# Patient Record
Sex: FEMALE | Race: WHITE | NOT HISPANIC OR LATINO
[De-identification: names, ages, dates, MRNs, and addresses within clinical notes are randomized per-mention and may not be internally consistent; named-entity substitution may affect disease eponyms.]

---

## 2017-09-01 ENCOUNTER — APPOINTMENT (OUTPATIENT)
Dept: MRI IMAGING | Facility: CLINIC | Age: 62
End: 2017-09-01
Payer: COMMERCIAL

## 2017-09-01 ENCOUNTER — OUTPATIENT (OUTPATIENT)
Dept: OUTPATIENT SERVICES | Facility: HOSPITAL | Age: 62
LOS: 1 days | End: 2017-09-01
Payer: COMMERCIAL

## 2017-09-01 DIAGNOSIS — Z00.8 ENCOUNTER FOR OTHER GENERAL EXAMINATION: ICD-10-CM

## 2017-09-01 PROCEDURE — 73721 MRI JNT OF LWR EXTRE W/O DYE: CPT | Mod: 26,RT

## 2017-09-01 PROCEDURE — 73721 MRI JNT OF LWR EXTRE W/O DYE: CPT

## 2018-05-23 ENCOUNTER — APPOINTMENT (OUTPATIENT)
Dept: ORTHOPEDIC SURGERY | Facility: CLINIC | Age: 63
End: 2018-05-23
Payer: COMMERCIAL

## 2018-05-23 VITALS — WEIGHT: 180 LBS | BODY MASS INDEX: 28.25 KG/M2 | HEIGHT: 67 IN

## 2018-05-23 DIAGNOSIS — Z78.9 OTHER SPECIFIED HEALTH STATUS: ICD-10-CM

## 2018-05-23 DIAGNOSIS — Z86.79 PERSONAL HISTORY OF OTHER DISEASES OF THE CIRCULATORY SYSTEM: ICD-10-CM

## 2018-05-23 DIAGNOSIS — Z82.61 FAMILY HISTORY OF ARTHRITIS: ICD-10-CM

## 2018-05-23 DIAGNOSIS — Z80.9 FAMILY HISTORY OF MALIGNANT NEOPLASM, UNSPECIFIED: ICD-10-CM

## 2018-05-23 PROCEDURE — 20610 DRAIN/INJ JOINT/BURSA W/O US: CPT | Mod: RT

## 2018-05-23 PROCEDURE — 73564 X-RAY EXAM KNEE 4 OR MORE: CPT | Mod: RT

## 2018-05-23 PROCEDURE — 99204 OFFICE O/P NEW MOD 45 MIN: CPT | Mod: 25

## 2018-05-23 PROCEDURE — 73560 X-RAY EXAM OF KNEE 1 OR 2: CPT | Mod: LT

## 2018-06-22 ENCOUNTER — APPOINTMENT (OUTPATIENT)
Dept: ORTHOPEDIC SURGERY | Facility: CLINIC | Age: 63
End: 2018-06-22
Payer: COMMERCIAL

## 2018-06-22 PROCEDURE — 20610 DRAIN/INJ JOINT/BURSA W/O US: CPT | Mod: RT

## 2018-08-23 ENCOUNTER — EMERGENCY (EMERGENCY)
Facility: HOSPITAL | Age: 63
LOS: 1 days | Discharge: HOME | End: 2018-08-23
Attending: EMERGENCY MEDICINE
Payer: COMMERCIAL

## 2018-08-23 VITALS
HEART RATE: 72 BPM | SYSTOLIC BLOOD PRESSURE: 177 MMHG | WEIGHT: 179.9 LBS | OXYGEN SATURATION: 100 % | HEIGHT: 68 IN | TEMPERATURE: 98 F | DIASTOLIC BLOOD PRESSURE: 102 MMHG | RESPIRATION RATE: 18 BRPM

## 2018-08-23 VITALS
OXYGEN SATURATION: 99 % | TEMPERATURE: 98 F | DIASTOLIC BLOOD PRESSURE: 89 MMHG | RESPIRATION RATE: 18 BRPM | SYSTOLIC BLOOD PRESSURE: 170 MMHG | HEART RATE: 72 BPM

## 2018-08-23 LAB
ALBUMIN SERPL ELPH-MCNC: 4.9 G/DL — SIGNIFICANT CHANGE UP (ref 3.3–5)
ALP SERPL-CCNC: 86 U/L — SIGNIFICANT CHANGE UP (ref 40–120)
ALT FLD-CCNC: 19 U/L — SIGNIFICANT CHANGE UP (ref 10–45)
ANION GAP SERPL CALC-SCNC: 15 MMOL/L — SIGNIFICANT CHANGE UP (ref 5–17)
AST SERPL-CCNC: 17 U/L — SIGNIFICANT CHANGE UP (ref 10–40)
BASOPHILS # BLD AUTO: 0.1 K/UL — SIGNIFICANT CHANGE UP (ref 0–0.2)
BASOPHILS NFR BLD AUTO: 1 % — SIGNIFICANT CHANGE UP (ref 0–2)
BILIRUB SERPL-MCNC: 0.5 MG/DL — SIGNIFICANT CHANGE UP (ref 0.2–1.2)
BUN SERPL-MCNC: 19 MG/DL — SIGNIFICANT CHANGE UP (ref 7–23)
CALCIUM SERPL-MCNC: 10.2 MG/DL — SIGNIFICANT CHANGE UP (ref 8.4–10.5)
CHLORIDE SERPL-SCNC: 95 MMOL/L — LOW (ref 96–108)
CO2 SERPL-SCNC: 25 MMOL/L — SIGNIFICANT CHANGE UP (ref 22–31)
CREAT SERPL-MCNC: 0.74 MG/DL — SIGNIFICANT CHANGE UP (ref 0.5–1.3)
EOSINOPHIL # BLD AUTO: 0.1 K/UL — SIGNIFICANT CHANGE UP (ref 0–0.5)
EOSINOPHIL NFR BLD AUTO: 2 % — SIGNIFICANT CHANGE UP (ref 0–6)
GLUCOSE SERPL-MCNC: 133 MG/DL — HIGH (ref 70–99)
HCT VFR BLD CALC: 45.1 % — HIGH (ref 34.5–45)
HGB BLD-MCNC: 15.4 G/DL — SIGNIFICANT CHANGE UP (ref 11.5–15.5)
LYMPHOCYTES # BLD AUTO: 1.8 K/UL — SIGNIFICANT CHANGE UP (ref 1–3.3)
LYMPHOCYTES # BLD AUTO: 26.9 % — SIGNIFICANT CHANGE UP (ref 13–44)
MCHC RBC-ENTMCNC: 29 PG — SIGNIFICANT CHANGE UP (ref 27–34)
MCHC RBC-ENTMCNC: 34 GM/DL — SIGNIFICANT CHANGE UP (ref 32–36)
MCV RBC AUTO: 85.3 FL — SIGNIFICANT CHANGE UP (ref 80–100)
MONOCYTES # BLD AUTO: 0.4 K/UL — SIGNIFICANT CHANGE UP (ref 0–0.9)
MONOCYTES NFR BLD AUTO: 5.7 % — SIGNIFICANT CHANGE UP (ref 2–14)
NEUTROPHILS # BLD AUTO: 4.4 K/UL — SIGNIFICANT CHANGE UP (ref 1.8–7.4)
NEUTROPHILS NFR BLD AUTO: 64.4 % — SIGNIFICANT CHANGE UP (ref 43–77)
PLATELET # BLD AUTO: 329 K/UL — SIGNIFICANT CHANGE UP (ref 150–400)
POTASSIUM SERPL-MCNC: 3.3 MMOL/L — LOW (ref 3.5–5.3)
POTASSIUM SERPL-SCNC: 3.3 MMOL/L — LOW (ref 3.5–5.3)
PROT SERPL-MCNC: 8.7 G/DL — HIGH (ref 6–8.3)
RBC # BLD: 5.29 M/UL — HIGH (ref 3.8–5.2)
RBC # FLD: 11.8 % — SIGNIFICANT CHANGE UP (ref 10.3–14.5)
SODIUM SERPL-SCNC: 135 MMOL/L — SIGNIFICANT CHANGE UP (ref 135–145)
TROPONIN T, HIGH SENSITIVITY RESULT: <6 NG/L — SIGNIFICANT CHANGE UP (ref 0–51)
WBC # BLD: 6.9 K/UL — SIGNIFICANT CHANGE UP (ref 3.8–10.5)
WBC # FLD AUTO: 6.9 K/UL — SIGNIFICANT CHANGE UP (ref 3.8–10.5)

## 2018-08-23 PROCEDURE — 70450 CT HEAD/BRAIN W/O DYE: CPT

## 2018-08-23 PROCEDURE — 96360 HYDRATION IV INFUSION INIT: CPT

## 2018-08-23 PROCEDURE — 70450 CT HEAD/BRAIN W/O DYE: CPT | Mod: 26

## 2018-08-23 PROCEDURE — 85027 COMPLETE CBC AUTOMATED: CPT

## 2018-08-23 PROCEDURE — 71045 X-RAY EXAM CHEST 1 VIEW: CPT | Mod: 26

## 2018-08-23 PROCEDURE — 80053 COMPREHEN METABOLIC PANEL: CPT

## 2018-08-23 PROCEDURE — 99284 EMERGENCY DEPT VISIT MOD MDM: CPT | Mod: 25

## 2018-08-23 PROCEDURE — 93005 ELECTROCARDIOGRAM TRACING: CPT | Mod: 76

## 2018-08-23 PROCEDURE — 84484 ASSAY OF TROPONIN QUANT: CPT

## 2018-08-23 PROCEDURE — 96361 HYDRATE IV INFUSION ADD-ON: CPT

## 2018-08-23 PROCEDURE — 99284 EMERGENCY DEPT VISIT MOD MDM: CPT

## 2018-08-23 PROCEDURE — 71045 X-RAY EXAM CHEST 1 VIEW: CPT

## 2018-08-23 RX ORDER — MECLIZINE HCL 12.5 MG
1 TABLET ORAL
Qty: 20 | Refills: 0
Start: 2018-08-23 | End: 2018-08-27

## 2018-08-23 RX ORDER — MECLIZINE HCL 12.5 MG
50 TABLET ORAL ONCE
Qty: 0 | Refills: 0 | Status: COMPLETED | OUTPATIENT
Start: 2018-08-23 | End: 2018-08-23

## 2018-08-23 RX ORDER — POTASSIUM CHLORIDE 20 MEQ
40 PACKET (EA) ORAL ONCE
Qty: 0 | Refills: 0 | Status: COMPLETED | OUTPATIENT
Start: 2018-08-23 | End: 2018-08-23

## 2018-08-23 RX ORDER — SODIUM CHLORIDE 9 MG/ML
1000 INJECTION INTRAMUSCULAR; INTRAVENOUS; SUBCUTANEOUS ONCE
Qty: 0 | Refills: 0 | Status: COMPLETED | OUTPATIENT
Start: 2018-08-23 | End: 2018-08-23

## 2018-08-23 RX ADMIN — SODIUM CHLORIDE 1000 MILLILITER(S): 9 INJECTION INTRAMUSCULAR; INTRAVENOUS; SUBCUTANEOUS at 11:40

## 2018-08-23 RX ADMIN — SODIUM CHLORIDE 1000 MILLILITER(S): 9 INJECTION INTRAMUSCULAR; INTRAVENOUS; SUBCUTANEOUS at 10:07

## 2018-08-23 RX ADMIN — Medication 40 MILLIEQUIVALENT(S): at 12:03

## 2018-08-23 RX ADMIN — Medication 0.5 MILLIGRAM(S): at 12:26

## 2018-08-23 RX ADMIN — Medication 50 MILLIGRAM(S): at 10:07

## 2018-08-23 NOTE — ED PROVIDER NOTE - PROGRESS NOTE DETAILS
CT negative. Pt felt better after 50 of meclozine, but had another episode of dizziness while going to the bathroom. Will give .5 ativan and reassess. Pt feeling much better after ativan. Able to walk without symptoms. Pt feeling much better after ativan. Able to walk without symptoms. Likely peripheral vertigo. Will DC with Rx for ativan and meclizine with f/u to cardiology and ENT.

## 2018-08-23 NOTE — ED PROVIDER NOTE - OBJECTIVE STATEMENT
62F hx of HTN, HLD, and CAD presenting to the ED with dizziness. She reports waking up at 1 AM to urinate and feeling sudden onset dizziness as soon as she stood up. Associated symptoms include nausea and SOB. She went to back to bed without issue, but decided to come to the ED when the same symptoms persisted this mornind. Denies any chest pain, numb 62F hx of HTN, HLD, and CAD presenting to the ED with dizziness. She reports waking up at 1 AM to urinate and feeling sudden onset dizziness as soon as she stood up. Associated symptoms include nausea and SOB. She went to back to bed without issue, but decided to come to the ED when the same symptoms persisted this morning. States "I don't feel like the room is spinning but I feel unsteady and lightheaded." Denies any prior occurrence of these symptoms. Denies any chest pain, numbness, tingling, weakness, headache, changes in vision, abdominal pain, vomiting, diarrhea, or recent illness. Normal neuro exam without dysmetria or ataxia on presentation.

## 2018-08-23 NOTE — ED ADULT NURSE NOTE - OBJECTIVE STATEMENT
62y f pt arrived in ED with son from home; pt c/o waking at around 2am feeling dizzy and nauseous; pt states was unsteady on feet went back to bed and slept; when woke again this am still feeling dizzy, weak, nauseous, unable to ambulate without assist; aox3; no resp distress; no chest pain; + sob; + diaphoresis; no vision changes; + perrl; abd soft nontender nondistended; no le edema; pt c/o leg cramping for the last month; pt also c/o headache; pt states has hx of headaches; pt denies fever/chills; no cough/congestion; no numbness; + areas of tingling; pt also c/o "belching"; iv placed; labs drawn; pt medicated per md order; safety/comfort maintained; son at bedside; ekg done; pt placed on cardiac monitor; nsr on monitor

## 2018-08-23 NOTE — ED PROVIDER NOTE - NS ED ROS FT
GENERAL: +dizziness. No fever or chills  EYES: no change in vision  HEENT: no trouble swallowing or speaking  CARDIAC: no chest pain  PULMONARY: +SOB. no cough   GI: +nausea. no abdominal pain, no vomiting, no diarrhea or constipation  : No changes in urination  SKIN: no rashes  NEURO: no headache, numbness, tingling, weakness  MSK: No joint pain     ~Chris Elmore PGY1

## 2018-08-23 NOTE — ED ADULT NURSE NOTE - NSIMPLEMENTINTERV_GEN_ALL_ED
Implemented All Universal Safety Interventions:  [place] to call system. Call bell, personal items and telephone within reach. Instruct patient to call for assistance. Room bathroom lighting operational. Non-slip footwear when patient is off stretcher. Physically safe environment: no spills, clutter or unnecessary equipment. Stretcher in lowest position, wheels locked, appropriate side rails in place.

## 2018-08-23 NOTE — ED PROVIDER NOTE - MEDICAL DECISION MAKING DETAILS
62F hx of HTN, HLD, and CAD presenting to the ED with dizziness. Does not say room is spinning but sounds more vertiginous than lightheadedness. No chest pain or neuro symptoms. No nystagmus and normal test of skew on exam. Most likely vertigo but due to age, RFs (HTN/HLD/CAD), and lack of prior occurrence will plan to obtain CT/CTA of head/neck. Will also obtain basic labs, trops (no chest pain but abnormal ekg without prior). Will administer meclozine and reassess. 62F hx of HTN, HLD, and CAD presenting to the ED with dizziness. Does not say room is spinning but sounds more vertiginous than lightheadedness. No chest pain or neuro symptoms. No nystagmus and normal test of skew on exam. Most likely vertigo but due to age, RFs (HTN/HLD/CAD), and lack of prior occurrence will plan to obtain CT/CTA of head/neck. Will also obtain basic labs, trops (no chest pain but abnormal ekg without prior). Will administer meclozine and reassess.    ADA Vaz MD: Pt is a 61 y/o female with PMH HTN, HLD, and CAD who p/w c/o dizziness. She reports waking up at 1 AM to urinate and feeling sudden onset dizziness as soon as she stood up. Associated symptoms include nausea and SOB. She went to back to bed without issue, but decided to come to the ED when the same symptoms persisted this morning. States states that she feels unsteady and lightheaded when she moves her head. Denies any prior occurrence of these symptoms. Denies any chest pain, numbness, tingling, weakness, headache, changes in vision, abdominal pain, vomiting, diarrhea, or recent illness. Normal neuro exam without dysmetria or ataxia on presentation. DDx: BPPV, Neurologic 62F hx of HTN, HLD, and CAD presenting to the ED with dizziness. Does not say room is spinning but sounds more vertiginous than lightheadedness. No chest pain or neuro symptoms. No nystagmus and normal test of skew on exam. Most likely vertigo but due to age, RFs (HTN/HLD/CAD), and lack of prior occurrence will plan to obtain CT/CTA of head/neck. Will also obtain basic labs, trops (no chest pain but abnormal ekg without prior). Will administer meclozine and reassess.    ADA Vaz MD: Pt is a 63 y/o female with PMH HTN, HLD, and CAD who p/w c/o dizziness. She reports waking up at 1 AM to urinate and feeling sudden onset dizziness as soon as she stood up. Associated symptoms include nausea and SOB. She went to back to bed without issue, but decided to come to the ED when the same symptoms persisted this morning. States states that she feels unsteady and lightheaded when she moves her head. Denies any prior occurrence of these symptoms. Denies any chest pain, numbness, tingling, weakness, headache, changes in vision, abdominal pain, vomiting, diarrhea, or recent illness. Normal neuro exam without dysmetria or ataxia on presentation. DDx: BPPV, Neurologic d/o, dehydration, orthostatic changes. Plan: IVF, meclizine, basic labs, re-assess. If no improvement, will consider further brain imaging, neurology consult

## 2018-08-23 NOTE — ED PROVIDER NOTE - PHYSICAL EXAMINATION
Gen: AAOx3, non-toxic  Head: NCAT  HEENT: EOMI, oral mucosa moist, normal conjunctiva  Lung: CTAB, no respiratory distress, no wheezes/rhonchi/rales B/L, speaking in full sentences  CV: RRR, no murmurs, rubs or gallops  Abd: soft, NTND, no guarding, no CVA tenderness  MSK: no visible deformities  Neuro: No focal sensory or motor deficits, normal CN exam, no dysmetria or ataxia, no nystagmus, normal test of skew.   Skin: Warm, well perfused, no rash  Psych: normal affect.     ~Chris Elmore PGY1

## 2018-08-29 PROBLEM — I10 ESSENTIAL (PRIMARY) HYPERTENSION: Chronic | Status: ACTIVE | Noted: 2018-08-23

## 2018-10-29 ENCOUNTER — APPOINTMENT (OUTPATIENT)
Dept: CARDIOTHORACIC SURGERY | Facility: CLINIC | Age: 63
End: 2018-10-29
Payer: COMMERCIAL

## 2018-10-29 ENCOUNTER — NON-APPOINTMENT (OUTPATIENT)
Age: 63
End: 2018-10-29

## 2018-10-29 VITALS
BODY MASS INDEX: 30.29 KG/M2 | SYSTOLIC BLOOD PRESSURE: 153 MMHG | DIASTOLIC BLOOD PRESSURE: 97 MMHG | WEIGHT: 193 LBS | HEIGHT: 67 IN | OXYGEN SATURATION: 97 % | RESPIRATION RATE: 16 BRPM | HEART RATE: 68 BPM | TEMPERATURE: 98.3 F

## 2018-10-29 VITALS — SYSTOLIC BLOOD PRESSURE: 141 MMHG | DIASTOLIC BLOOD PRESSURE: 87 MMHG

## 2018-10-29 DIAGNOSIS — R06.02 SHORTNESS OF BREATH: ICD-10-CM

## 2018-10-29 PROCEDURE — 93000 ELECTROCARDIOGRAM COMPLETE: CPT

## 2018-10-29 PROCEDURE — 99204 OFFICE O/P NEW MOD 45 MIN: CPT

## 2018-10-29 RX ORDER — HYDROCHLOROTHIAZIDE 12.5 MG/1
TABLET ORAL
Refills: 0 | Status: DISCONTINUED | COMMUNITY
End: 2018-10-29

## 2018-11-08 ENCOUNTER — APPOINTMENT (OUTPATIENT)
Dept: CV DIAGNOSITCS | Facility: HOSPITAL | Age: 63
End: 2018-11-08

## 2018-11-08 ENCOUNTER — OUTPATIENT (OUTPATIENT)
Dept: OUTPATIENT SERVICES | Facility: HOSPITAL | Age: 63
LOS: 1 days | End: 2018-11-08
Payer: COMMERCIAL

## 2018-11-08 DIAGNOSIS — I10 ESSENTIAL (PRIMARY) HYPERTENSION: ICD-10-CM

## 2018-11-08 PROCEDURE — 93306 TTE W/DOPPLER COMPLETE: CPT | Mod: 26

## 2018-11-08 PROCEDURE — 93306 TTE W/DOPPLER COMPLETE: CPT

## 2018-12-26 ENCOUNTER — APPOINTMENT (OUTPATIENT)
Dept: ULTRASOUND IMAGING | Facility: CLINIC | Age: 63
End: 2018-12-26
Payer: COMMERCIAL

## 2018-12-26 ENCOUNTER — OUTPATIENT (OUTPATIENT)
Dept: OUTPATIENT SERVICES | Facility: HOSPITAL | Age: 63
LOS: 1 days | End: 2018-12-26
Payer: COMMERCIAL

## 2018-12-26 ENCOUNTER — APPOINTMENT (OUTPATIENT)
Dept: RADIOLOGY | Facility: CLINIC | Age: 63
End: 2018-12-26
Payer: COMMERCIAL

## 2018-12-26 ENCOUNTER — APPOINTMENT (OUTPATIENT)
Dept: MAMMOGRAPHY | Facility: CLINIC | Age: 63
End: 2018-12-26
Payer: COMMERCIAL

## 2018-12-26 DIAGNOSIS — Z00.8 ENCOUNTER FOR OTHER GENERAL EXAMINATION: ICD-10-CM

## 2018-12-26 PROCEDURE — 77080 DXA BONE DENSITY AXIAL: CPT | Mod: 26

## 2018-12-26 PROCEDURE — 77080 DXA BONE DENSITY AXIAL: CPT

## 2018-12-26 PROCEDURE — 77067 SCR MAMMO BI INCL CAD: CPT | Mod: 26

## 2018-12-26 PROCEDURE — 76641 ULTRASOUND BREAST COMPLETE: CPT | Mod: 26,50

## 2018-12-26 PROCEDURE — 77063 BREAST TOMOSYNTHESIS BI: CPT | Mod: 26

## 2018-12-26 PROCEDURE — 77063 BREAST TOMOSYNTHESIS BI: CPT

## 2018-12-26 PROCEDURE — 76641 ULTRASOUND BREAST COMPLETE: CPT

## 2018-12-26 PROCEDURE — 77067 SCR MAMMO BI INCL CAD: CPT

## 2019-02-01 ENCOUNTER — NON-APPOINTMENT (OUTPATIENT)
Age: 64
End: 2019-02-01

## 2019-02-01 ENCOUNTER — APPOINTMENT (OUTPATIENT)
Dept: CARDIOTHORACIC SURGERY | Facility: CLINIC | Age: 64
End: 2019-02-01
Payer: COMMERCIAL

## 2019-02-01 VITALS — DIASTOLIC BLOOD PRESSURE: 80 MMHG | SYSTOLIC BLOOD PRESSURE: 138 MMHG

## 2019-02-01 VITALS
OXYGEN SATURATION: 96 % | DIASTOLIC BLOOD PRESSURE: 95 MMHG | TEMPERATURE: 98.6 F | HEIGHT: 67 IN | HEART RATE: 76 BPM | WEIGHT: 180 LBS | RESPIRATION RATE: 16 BRPM | BODY MASS INDEX: 28.25 KG/M2 | SYSTOLIC BLOOD PRESSURE: 168 MMHG

## 2019-02-01 VITALS — SYSTOLIC BLOOD PRESSURE: 149 MMHG | DIASTOLIC BLOOD PRESSURE: 83 MMHG

## 2019-02-01 PROCEDURE — 99214 OFFICE O/P EST MOD 30 MIN: CPT

## 2019-02-01 PROCEDURE — 93000 ELECTROCARDIOGRAM COMPLETE: CPT

## 2019-02-01 NOTE — DISCUSSION/SUMMARY
[Essential Hypertension] : essential hypertension [Stable] : stable [Improving] : improving [Responding to Treatment] : responding to treatment [None] : none [Exercise Regimen] : an exercise regimen [Weight Loss] : weight loss [Patient] : the patient [FreeTextEntry1] : Sol reports no cardiac issues since her prior visit.  Her TTE was favorable, demonstrating LV remodeling, for which she is on an ARB.  She will have a BMP with her PCP.  She may have conjunctivitis, and will see her PCP.  I discussed scheduling an exercise stress test (to assess functional capacity and BP response to exercise), which she will consider.

## 2019-02-01 NOTE — HISTORY OF PRESENT ILLNESS
[FreeTextEntry1] : Sol reports no cardiac issues since her prior visit.  She may have a left eye conjunctivitis.  She has had no further visual disturbances since stopping HCTZ.  She has no angina but still mild exertional dyspnea, which she attributes to deconditioning.  She looks well.  Her mom just got back up from [place] (following her TAVR last fall) and is doing well.

## 2019-02-01 NOTE — PHYSICAL EXAM
[General Appearance - Well Developed] : well developed [Normal Oral Mucosa] : normal oral mucosa [] : no respiratory distress [Heart Rate And Rhythm] : heart rate and rhythm were normal [Bowel Sounds] : normal bowel sounds [Abnormal Walk] : normal gait [Nail Clubbing] : no clubbing of the fingernails [Cyanosis, Localized] : no localized cyanosis [Skin Color & Pigmentation] : normal skin color and pigmentation [Oriented To Time, Place, And Person] : oriented to person, place, and time [FreeTextEntry1] : LT eye redness for 2 weeks, injected [Normal Jugular Venous V Waves Present] : normal jugular venous V waves present [Heart Sounds] : normal S1 and S2 [Murmurs] : no murmurs present [Edema] : no peripheral edema present

## 2019-02-01 NOTE — REVIEW OF SYSTEMS
[Dyspnea on exertion] : dyspnea during exertion [Lower Ext Edema] : lower extremity edema [Joint Pain] : joint pain [Negative] : Heme/Lymph [Fever] : no fever [Chills] : no chills [Shortness Of Breath] : no shortness of breath [Chest  Pressure] : no chest pressure [Cough] : no cough [Dizziness] : no dizziness [Numbness (Hypesthesia)] : no numbness [Anxiety] : no anxiety [Easy Bleeding] : no tendency for easy bleeding [Easy Bruising] : no tendency for easy bruising

## 2019-09-06 ENCOUNTER — APPOINTMENT (OUTPATIENT)
Dept: CARDIOTHORACIC SURGERY | Facility: CLINIC | Age: 64
End: 2019-09-06
Payer: COMMERCIAL

## 2019-09-06 VITALS
HEART RATE: 68 BPM | DIASTOLIC BLOOD PRESSURE: 80 MMHG | BODY MASS INDEX: 28.04 KG/M2 | HEIGHT: 68 IN | OXYGEN SATURATION: 98 % | SYSTOLIC BLOOD PRESSURE: 138 MMHG | RESPIRATION RATE: 12 BRPM | WEIGHT: 185 LBS | TEMPERATURE: 98.3 F

## 2019-09-06 VITALS — DIASTOLIC BLOOD PRESSURE: 78 MMHG | SYSTOLIC BLOOD PRESSURE: 121 MMHG

## 2019-09-06 DIAGNOSIS — Z87.891 PERSONAL HISTORY OF NICOTINE DEPENDENCE: ICD-10-CM

## 2019-09-06 DIAGNOSIS — Z82.49 FAMILY HISTORY OF ISCHEMIC HEART DISEASE AND OTHER DISEASES OF THE CIRCULATORY SYSTEM: ICD-10-CM

## 2019-09-06 PROCEDURE — 99214 OFFICE O/P EST MOD 30 MIN: CPT

## 2019-09-06 PROCEDURE — 93000 ELECTROCARDIOGRAM COMPLETE: CPT

## 2019-09-06 NOTE — PHYSICAL EXAM
[Normal Appearance] : normal appearance [General Appearance - Well Developed] : well developed [Well Groomed] : well groomed [General Appearance - Well Nourished] : well nourished [General Appearance - In No Acute Distress] : no acute distress [No Deformities] : no deformities [Eyelids - No Xanthelasma] : the eyelids demonstrated no xanthelasmas [Normal Conjunctiva] : the conjunctiva exhibited no abnormalities [Normal Oral Mucosa] : normal oral mucosa [No Oral Pallor] : no oral pallor [Normal Jugular Venous A Waves Present] : normal jugular venous A waves present [No Oral Cyanosis] : no oral cyanosis [Normal Jugular Venous V Waves Present] : normal jugular venous V waves present [No Jugular Venous Rick A Waves] : no jugular venous rick A waves [Respiration, Rhythm And Depth] : normal respiratory rhythm and effort [Auscultation Breath Sounds / Voice Sounds] : lungs were clear to auscultation bilaterally [Exaggerated Use Of Accessory Muscles For Inspiration] : no accessory muscle use [Normal Rate] : normal [Rhythm Regular] : regular [Heart Rate ___] : [unfilled] bpm [Normal S1] : normal S1 [Normal S2] : normal S2 [No Gallop] : no gallop heard [No Murmur] : no murmurs heard [2+] : left 2+ [No Abnormalities] : the abdominal aorta was not enlarged and no bruit was heard [No Pitting Edema] : no pitting edema present [Abdomen Soft] : soft [Bowel Sounds] : normal bowel sounds [Abdomen Tenderness] : non-tender [Abdomen Mass (___ Cm)] : no abdominal mass palpated [Gait - Sufficient For Exercise Testing] : the gait was sufficient for exercise testing [Abnormal Walk] : normal gait [Cyanosis, Localized] : no localized cyanosis [Nail Clubbing] : no clubbing of the fingernails [Petechial Hemorrhages (___cm)] : no petechial hemorrhages [Skin Color & Pigmentation] : normal skin color and pigmentation [] : no rash [No Venous Stasis] : no venous stasis [Skin Lesions] : no skin lesions [No Skin Ulcers] : no skin ulcer [No Xanthoma] : no  xanthoma was observed [Oriented To Time, Place, And Person] : oriented to person, place, and time [Impaired Insight] : insight and judgment were intact [Affect] : the affect was normal [Mood] : the mood was normal [Memory Recent] : recent memory was not impaired [Memory Remote] : remote memory was not impaired [No Anxiety] : not feeling anxious [Click] : no click [Distant] : the heart sounds were ~L not distant [Pericardial Rub] : no pericardial rub [Right Carotid Bruit] : no bruit heard over the right carotid [Left Carotid Bruit] : no bruit heard over the left carotid [Bruit] : no bruit heard [Rt] : no varicose veins of the right leg [Lt] : no varicose veins of the left leg

## 2019-09-06 NOTE — DISCUSSION/SUMMARY
[Essential Hypertension] : essential hypertension [Stable] : stable [Responding to Treatment] : responding to treatment [None] : none [Weight Loss] : weight loss [Patient] : the patient [FreeTextEntry1] : Sol had a recent episode of somewhat atypical chest pain (and has been active since without recurrent symptoms), however given her extensive FH of CAD and her risk factors (HTN), I am recommending a nuclear stress test.  She would like to attempt exercise, but I suspect she will need to be converted to a pharmacologic exam given her orthopedic issues.  She will continue her current dose of ARB.  She will have labs when she sees her PCP this fall and forward them for my review.

## 2019-09-06 NOTE — HISTORY OF PRESENT ILLNESS
[FreeTextEntry1] : SOHAM MCNEAL is a 63 year old female here on 09/06/2019, 7 months after she was last seen.  She comes to the office today reporting feeling overall "ok, fine".  She did have an episode of chest tightness with loss of breath and fatigue.  It seemed to last about 30 minutes and it went away on it's own.  Later that night, she had nausea and diarrhea.  She may have had a fever because she was cold and she is never cold. It has not happened since that time.  She currently denies fever, chills, dizziness, lightheadedness, syncope, or peripheral edema.  Her energy and appetite are good though she does get "tired a little quicker" which she attributes to her right knee which needs to be replaced.  Denies bowel or bladder problems.  Twice a year she gets a GI bug but no further bowel or bladder problems.  Last FLP is unknown.  She reports having had a stress test "a long time ago. More than 10 years".  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  She rarely exercises but she did join a gym and goes 1-2 times a week.  \par \par She feels that the above noted episode "must have been a virus".  She has been doing her usual activities since that time without issue, limitations, or symptoms.  There have been no medication changes.\par

## 2019-09-06 NOTE — REVIEW OF SYSTEMS
[Shortness Of Breath] : shortness of breath [Joint Pain] : joint pain [Chest Pain] : chest pain [Joint Swelling] : joint swelling [Joint Stiffness] : joint stiffness [see HPI] : see HPI [Negative] : Endocrine [Dizziness] : no dizziness

## 2019-09-06 NOTE — CARDIOLOGY SUMMARY
[LVEF ___%] : LVEF [unfilled]% [___] : [unfilled] [None] : no pulmonary hypertension [Normal] : normal LA size [___] : [unfilled]

## 2019-09-10 ENCOUNTER — APPOINTMENT (OUTPATIENT)
Dept: DERMATOLOGY | Facility: CLINIC | Age: 64
End: 2019-09-10
Payer: COMMERCIAL

## 2019-09-10 ENCOUNTER — LABORATORY RESULT (OUTPATIENT)
Age: 64
End: 2019-09-10

## 2019-09-10 VITALS — SYSTOLIC BLOOD PRESSURE: 125 MMHG | DIASTOLIC BLOOD PRESSURE: 60 MMHG

## 2019-09-10 PROCEDURE — 99204 OFFICE O/P NEW MOD 45 MIN: CPT

## 2019-09-17 ENCOUNTER — APPOINTMENT (OUTPATIENT)
Dept: SURGICAL ONCOLOGY | Facility: CLINIC | Age: 64
End: 2019-09-17
Payer: COMMERCIAL

## 2019-09-17 VITALS
OXYGEN SATURATION: 97 % | WEIGHT: 185 LBS | DIASTOLIC BLOOD PRESSURE: 81 MMHG | SYSTOLIC BLOOD PRESSURE: 149 MMHG | RESPIRATION RATE: 12 BRPM | HEART RATE: 81 BPM | BODY MASS INDEX: 28.04 KG/M2 | HEIGHT: 68 IN

## 2019-09-17 PROCEDURE — 99204 OFFICE O/P NEW MOD 45 MIN: CPT

## 2019-09-19 ENCOUNTER — APPOINTMENT (OUTPATIENT)
Dept: CV DIAGNOSTICS | Facility: HOSPITAL | Age: 64
End: 2019-09-19

## 2019-10-01 ENCOUNTER — APPOINTMENT (OUTPATIENT)
Dept: PLASTIC SURGERY | Facility: CLINIC | Age: 64
End: 2019-10-01
Payer: COMMERCIAL

## 2019-10-01 VITALS
BODY MASS INDEX: 28.04 KG/M2 | SYSTOLIC BLOOD PRESSURE: 144 MMHG | HEIGHT: 68 IN | HEART RATE: 58 BPM | WEIGHT: 185 LBS | DIASTOLIC BLOOD PRESSURE: 88 MMHG

## 2019-10-01 PROCEDURE — 99203 OFFICE O/P NEW LOW 30 MIN: CPT

## 2019-10-02 NOTE — REASON FOR VISIT
[Initial Evaluation] : an initial evaluation [FreeTextEntry1] : pt is here for Pre-Op consultation. pt surgery is schedule 10/17/19  wide excision melanoma left back. pt reports initially area looked like a pimple. pt reports itchiness of area, lesion would appear and go away. pt lesion noticed after Derm examination. pt denies any personal hx of skin cancer , but reports family hx of skin cancer (BCC).

## 2019-10-02 NOTE — HISTORY OF PRESENT ILLNESS
[FreeTextEntry1] : Ms. Blount is a 62 y/o female referred by Dr. Oleary with a nodular lesion of the left back. She underwent biopsy 09/10/2019 by dermatology which demonstrated a 1.7 mm nodular melanoma with mitotic rate 8/mm2, but without ulceration or regression. She is referred for surgical management.\par Patient believes lesion has been present for months but denies associated pain, bleeding, ulceration. She has no history of cutaneous malignancy.

## 2019-10-03 ENCOUNTER — OUTPATIENT (OUTPATIENT)
Dept: OUTPATIENT SERVICES | Facility: HOSPITAL | Age: 64
LOS: 1 days | End: 2019-10-03
Payer: COMMERCIAL

## 2019-10-03 VITALS
TEMPERATURE: 99 F | HEART RATE: 56 BPM | DIASTOLIC BLOOD PRESSURE: 78 MMHG | RESPIRATION RATE: 14 BRPM | SYSTOLIC BLOOD PRESSURE: 122 MMHG | WEIGHT: 192.02 LBS | HEIGHT: 67.5 IN | OXYGEN SATURATION: 97 %

## 2019-10-03 DIAGNOSIS — D03.59 MELANOMA IN SITU OF OTHER PART OF TRUNK: ICD-10-CM

## 2019-10-03 DIAGNOSIS — Z98.891 HISTORY OF UTERINE SCAR FROM PREVIOUS SURGERY: Chronic | ICD-10-CM

## 2019-10-03 DIAGNOSIS — Z98.890 OTHER SPECIFIED POSTPROCEDURAL STATES: Chronic | ICD-10-CM

## 2019-10-03 DIAGNOSIS — Z98.1 ARTHRODESIS STATUS: Chronic | ICD-10-CM

## 2019-10-03 LAB
ALBUMIN SERPL ELPH-MCNC: 4.4 G/DL — SIGNIFICANT CHANGE UP (ref 3.3–5)
ALBUMIN SERPL ELPH-MCNC: 4.4 G/DL — SIGNIFICANT CHANGE UP (ref 3.3–5)
ALP SERPL-CCNC: 75 U/L — SIGNIFICANT CHANGE UP (ref 40–120)
ALP SERPL-CCNC: 75 U/L — SIGNIFICANT CHANGE UP (ref 40–120)
ALT FLD-CCNC: 18 U/L — SIGNIFICANT CHANGE UP (ref 4–33)
ALT FLD-CCNC: 18 U/L — SIGNIFICANT CHANGE UP (ref 4–33)
ANION GAP SERPL CALC-SCNC: 13 MMO/L — SIGNIFICANT CHANGE UP (ref 7–14)
ANION GAP SERPL CALC-SCNC: 13 MMO/L — SIGNIFICANT CHANGE UP (ref 7–14)
AST SERPL-CCNC: 14 U/L — SIGNIFICANT CHANGE UP (ref 4–32)
AST SERPL-CCNC: 14 U/L — SIGNIFICANT CHANGE UP (ref 4–32)
BASOPHILS # BLD AUTO: 0.04 K/UL — SIGNIFICANT CHANGE UP (ref 0–0.2)
BASOPHILS NFR BLD AUTO: 0.6 % — SIGNIFICANT CHANGE UP (ref 0–2)
BILIRUB DIRECT SERPL-MCNC: < 0.2 MG/DL — SIGNIFICANT CHANGE UP (ref 0.1–0.2)
BILIRUB SERPL-MCNC: 0.5 MG/DL — SIGNIFICANT CHANGE UP (ref 0.2–1.2)
BILIRUB SERPL-MCNC: 0.5 MG/DL — SIGNIFICANT CHANGE UP (ref 0.2–1.2)
BUN SERPL-MCNC: 18 MG/DL — SIGNIFICANT CHANGE UP (ref 7–23)
BUN SERPL-MCNC: 18 MG/DL — SIGNIFICANT CHANGE UP (ref 7–23)
CALCIUM SERPL-MCNC: 9.7 MG/DL — SIGNIFICANT CHANGE UP (ref 8.4–10.5)
CALCIUM SERPL-MCNC: 9.7 MG/DL — SIGNIFICANT CHANGE UP (ref 8.4–10.5)
CHLORIDE SERPL-SCNC: 101 MMOL/L — SIGNIFICANT CHANGE UP (ref 98–107)
CHLORIDE SERPL-SCNC: 101 MMOL/L — SIGNIFICANT CHANGE UP (ref 98–107)
CO2 SERPL-SCNC: 25 MMOL/L — SIGNIFICANT CHANGE UP (ref 22–31)
CO2 SERPL-SCNC: 25 MMOL/L — SIGNIFICANT CHANGE UP (ref 22–31)
CREAT SERPL-MCNC: 0.76 MG/DL — SIGNIFICANT CHANGE UP (ref 0.5–1.3)
CREAT SERPL-MCNC: 0.76 MG/DL — SIGNIFICANT CHANGE UP (ref 0.5–1.3)
EOSINOPHIL # BLD AUTO: 0.19 K/UL — SIGNIFICANT CHANGE UP (ref 0–0.5)
EOSINOPHIL NFR BLD AUTO: 2.8 % — SIGNIFICANT CHANGE UP (ref 0–6)
GLUCOSE SERPL-MCNC: 107 MG/DL — HIGH (ref 70–99)
GLUCOSE SERPL-MCNC: 107 MG/DL — HIGH (ref 70–99)
HCT VFR BLD CALC: 38.6 % — SIGNIFICANT CHANGE UP (ref 34.5–45)
HCT VFR BLD CALC: 38.6 % — SIGNIFICANT CHANGE UP (ref 34.5–45)
HGB BLD-MCNC: 12.8 G/DL — SIGNIFICANT CHANGE UP (ref 11.5–15.5)
HGB BLD-MCNC: 12.8 G/DL — SIGNIFICANT CHANGE UP (ref 11.5–15.5)
IMM GRANULOCYTES NFR BLD AUTO: 0.4 % — SIGNIFICANT CHANGE UP (ref 0–1.5)
LYMPHOCYTES # BLD AUTO: 2.11 K/UL — SIGNIFICANT CHANGE UP (ref 1–3.3)
LYMPHOCYTES # BLD AUTO: 30.8 % — SIGNIFICANT CHANGE UP (ref 13–44)
MCHC RBC-ENTMCNC: 29 PG — SIGNIFICANT CHANGE UP (ref 27–34)
MCHC RBC-ENTMCNC: 29 PG — SIGNIFICANT CHANGE UP (ref 27–34)
MCHC RBC-ENTMCNC: 33.2 % — SIGNIFICANT CHANGE UP (ref 32–36)
MCHC RBC-ENTMCNC: 33.2 % — SIGNIFICANT CHANGE UP (ref 32–36)
MCV RBC AUTO: 87.5 FL — SIGNIFICANT CHANGE UP (ref 80–100)
MCV RBC AUTO: 87.5 FL — SIGNIFICANT CHANGE UP (ref 80–100)
MONOCYTES # BLD AUTO: 0.52 K/UL — SIGNIFICANT CHANGE UP (ref 0–0.9)
MONOCYTES NFR BLD AUTO: 7.6 % — SIGNIFICANT CHANGE UP (ref 2–14)
NEUTROPHILS # BLD AUTO: 3.97 K/UL — SIGNIFICANT CHANGE UP (ref 1.8–7.4)
NEUTROPHILS NFR BLD AUTO: 57.8 % — SIGNIFICANT CHANGE UP (ref 43–77)
NRBC # FLD: 0 K/UL — SIGNIFICANT CHANGE UP (ref 0–0)
NRBC # FLD: 0 K/UL — SIGNIFICANT CHANGE UP (ref 0–0)
PLATELET # BLD AUTO: 294 K/UL — SIGNIFICANT CHANGE UP (ref 150–400)
PLATELET # BLD AUTO: 294 K/UL — SIGNIFICANT CHANGE UP (ref 150–400)
PMV BLD: 9.4 FL — SIGNIFICANT CHANGE UP (ref 7–13)
PMV BLD: 9.4 FL — SIGNIFICANT CHANGE UP (ref 7–13)
POTASSIUM SERPL-MCNC: 4.3 MMOL/L — SIGNIFICANT CHANGE UP (ref 3.5–5.3)
POTASSIUM SERPL-MCNC: 4.3 MMOL/L — SIGNIFICANT CHANGE UP (ref 3.5–5.3)
POTASSIUM SERPL-SCNC: 4.3 MMOL/L — SIGNIFICANT CHANGE UP (ref 3.5–5.3)
POTASSIUM SERPL-SCNC: 4.3 MMOL/L — SIGNIFICANT CHANGE UP (ref 3.5–5.3)
PROT SERPL-MCNC: 7.6 G/DL — SIGNIFICANT CHANGE UP (ref 6–8.3)
PROT SERPL-MCNC: 7.6 G/DL — SIGNIFICANT CHANGE UP (ref 6–8.3)
RBC # BLD: 4.41 M/UL — SIGNIFICANT CHANGE UP (ref 3.8–5.2)
RBC # BLD: 4.41 M/UL — SIGNIFICANT CHANGE UP (ref 3.8–5.2)
RBC # FLD: 12 % — SIGNIFICANT CHANGE UP (ref 10.3–14.5)
RBC # FLD: 12 % — SIGNIFICANT CHANGE UP (ref 10.3–14.5)
SODIUM SERPL-SCNC: 139 MMOL/L — SIGNIFICANT CHANGE UP (ref 135–145)
SODIUM SERPL-SCNC: 139 MMOL/L — SIGNIFICANT CHANGE UP (ref 135–145)
WBC # BLD: 6.86 K/UL — SIGNIFICANT CHANGE UP (ref 3.8–10.5)
WBC # BLD: 6.86 K/UL — SIGNIFICANT CHANGE UP (ref 3.8–10.5)
WBC # FLD AUTO: 6.86 K/UL — SIGNIFICANT CHANGE UP (ref 3.8–10.5)
WBC # FLD AUTO: 6.86 K/UL — SIGNIFICANT CHANGE UP (ref 3.8–10.5)

## 2019-10-03 PROCEDURE — 93010 ELECTROCARDIOGRAM REPORT: CPT

## 2019-10-03 RX ORDER — HYDROCHLOROTHIAZIDE 25 MG
0 TABLET ORAL
Qty: 0 | Refills: 0 | DISCHARGE

## 2019-10-03 RX ORDER — SODIUM CHLORIDE 9 MG/ML
1000 INJECTION, SOLUTION INTRAVENOUS
Refills: 0 | Status: DISCONTINUED | OUTPATIENT
Start: 2019-10-16 | End: 2019-11-04

## 2019-10-03 NOTE — H&P PST ADULT - NSICDXFAMILYHX_GEN_ALL_CORE_FT
FAMILY HISTORY:  Family history of breast cancer in mother  Family history of skin cancer, father  Family hx of hypertension, mother  FHx: heart disease, mother & father

## 2019-10-03 NOTE — H&P PST ADULT - NSICDXPROBLEM_GEN_ALL_CORE_FT
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  OR booking notified via fax. 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  JOSELYN precautions recommended.  OR booking notified via fax.

## 2019-10-03 NOTE — H&P PST ADULT - LYMPHATIC
supraclavicular R/posterior cervical L/posterior cervical R/supraclavicular L/anterior cervical R/anterior cervical L

## 2019-10-03 NOTE — H&P PST ADULT - NSICDXPASTMEDICALHX_GEN_ALL_CORE_FT
PAST MEDICAL HISTORY:  Hypertension     Obesity PAST MEDICAL HISTORY:  Hypertension     Malignant melanoma in situ     Obesity

## 2019-10-03 NOTE — H&P PST ADULT - HISTORY OF PRESENT ILLNESS
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  Per pt went for routine dermatology eval last month.  Biopsy done of left back.   Dx melanoma in situ of other part of trunk

## 2019-10-03 NOTE — H&P PST ADULT - NSICDXPASTSURGICALHX_GEN_ALL_CORE_FT
PAST SURGICAL HISTORY:  H/O  section  &     H/O knee surgery as teen  - open tendon repair    H/O spinal fusion 1984 C4-5

## 2019-10-03 NOTE — H&P PST ADULT - MUSCULOSKELETAL
detailed exam ROM intact/no joint swelling/no joint erythema details… right knee/decreased ROM due to pain/joint swelling/no joint swelling/no joint erythema

## 2019-10-03 NOTE — H&P PST ADULT - NEGATIVE GASTROINTESTINAL SYMPTOMS
no abdominal pain/no melena/no constipation/no diarrhea/no change in bowel habits/no nausea/no vomiting

## 2019-10-03 NOTE — H&P PST ADULT - NSANTHOSAYNRD_GEN_A_CORE
No. JOSELYN screening performed.  STOP BANG Legend: 0-2 = LOW Risk; 3-4 = INTERMEDIATE Risk; 5-8 = HIGH Risk

## 2019-10-15 ENCOUNTER — FORM ENCOUNTER (OUTPATIENT)
Age: 64
End: 2019-10-15

## 2019-10-15 ENCOUNTER — TRANSCRIPTION ENCOUNTER (OUTPATIENT)
Age: 64
End: 2019-10-15

## 2019-10-16 ENCOUNTER — APPOINTMENT (OUTPATIENT)
Dept: NUCLEAR MEDICINE | Facility: HOSPITAL | Age: 64
End: 2019-10-16

## 2019-10-16 ENCOUNTER — OUTPATIENT (OUTPATIENT)
Dept: OUTPATIENT SERVICES | Facility: HOSPITAL | Age: 64
LOS: 1 days | Discharge: HOME | End: 2019-10-16
Payer: COMMERCIAL

## 2019-10-16 ENCOUNTER — INPATIENT (INPATIENT)
Facility: HOSPITAL | Age: 64
LOS: 0 days | Discharge: HOME | End: 2019-10-17
Attending: PLASTIC SURGERY | Admitting: PLASTIC SURGERY
Payer: COMMERCIAL

## 2019-10-16 ENCOUNTER — APPOINTMENT (OUTPATIENT)
Dept: SURGICAL ONCOLOGY | Facility: HOSPITAL | Age: 64
End: 2019-10-16

## 2019-10-16 ENCOUNTER — RESULT REVIEW (OUTPATIENT)
Age: 64
End: 2019-10-16

## 2019-10-16 ENCOUNTER — TRANSCRIPTION ENCOUNTER (OUTPATIENT)
Age: 64
End: 2019-10-16

## 2019-10-16 VITALS
TEMPERATURE: 98 F | DIASTOLIC BLOOD PRESSURE: 99 MMHG | HEART RATE: 76 BPM | SYSTOLIC BLOOD PRESSURE: 200 MMHG | OXYGEN SATURATION: 97 % | RESPIRATION RATE: 15 BRPM

## 2019-10-16 VITALS
RESPIRATION RATE: 16 BRPM | TEMPERATURE: 98 F | HEIGHT: 67.5 IN | WEIGHT: 192.02 LBS | HEART RATE: 68 BPM | SYSTOLIC BLOOD PRESSURE: 150 MMHG | DIASTOLIC BLOOD PRESSURE: 70 MMHG | OXYGEN SATURATION: 98 %

## 2019-10-16 VITALS
SYSTOLIC BLOOD PRESSURE: 136 MMHG | DIASTOLIC BLOOD PRESSURE: 74 MMHG | HEART RATE: 60 BPM | OXYGEN SATURATION: 96 % | RESPIRATION RATE: 16 BRPM

## 2019-10-16 DIAGNOSIS — Z98.890 OTHER SPECIFIED POSTPROCEDURAL STATES: Chronic | ICD-10-CM

## 2019-10-16 DIAGNOSIS — D03.59 MELANOMA IN SITU OF OTHER PART OF TRUNK: ICD-10-CM

## 2019-10-16 DIAGNOSIS — Z98.891 HISTORY OF UTERINE SCAR FROM PREVIOUS SURGERY: Chronic | ICD-10-CM

## 2019-10-16 DIAGNOSIS — Z98.1 ARTHRODESIS STATUS: Chronic | ICD-10-CM

## 2019-10-16 DIAGNOSIS — T81.9XXA UNSPECIFIED COMPLICATION OF PROCEDURE, INITIAL ENCOUNTER: ICD-10-CM

## 2019-10-16 LAB
ALBUMIN SERPL ELPH-MCNC: 4.6 G/DL — SIGNIFICANT CHANGE UP (ref 3.3–5)
ALP SERPL-CCNC: 84 U/L — SIGNIFICANT CHANGE UP (ref 40–120)
ALT FLD-CCNC: 21 U/L — SIGNIFICANT CHANGE UP (ref 4–33)
ANION GAP SERPL CALC-SCNC: 15 MMO/L — HIGH (ref 7–14)
APTT BLD: 30.9 SEC — SIGNIFICANT CHANGE UP (ref 27.5–36.3)
AST SERPL-CCNC: 14 U/L — SIGNIFICANT CHANGE UP (ref 4–32)
BASOPHILS # BLD AUTO: 0.01 K/UL — SIGNIFICANT CHANGE UP (ref 0–0.2)
BASOPHILS NFR BLD AUTO: 0.1 % — SIGNIFICANT CHANGE UP (ref 0–2)
BILIRUB SERPL-MCNC: 0.3 MG/DL — SIGNIFICANT CHANGE UP (ref 0.2–1.2)
BUN SERPL-MCNC: 10 MG/DL — SIGNIFICANT CHANGE UP (ref 7–23)
CALCIUM SERPL-MCNC: 9.5 MG/DL — SIGNIFICANT CHANGE UP (ref 8.4–10.5)
CHLORIDE SERPL-SCNC: 103 MMOL/L — SIGNIFICANT CHANGE UP (ref 98–107)
CO2 SERPL-SCNC: 22 MMOL/L — SIGNIFICANT CHANGE UP (ref 22–31)
CREAT SERPL-MCNC: 0.56 MG/DL — SIGNIFICANT CHANGE UP (ref 0.5–1.3)
EOSINOPHIL # BLD AUTO: 0 K/UL — SIGNIFICANT CHANGE UP (ref 0–0.5)
EOSINOPHIL NFR BLD AUTO: 0 % — SIGNIFICANT CHANGE UP (ref 0–6)
GLUCOSE SERPL-MCNC: 158 MG/DL — HIGH (ref 70–99)
HCT VFR BLD CALC: 40.8 % — SIGNIFICANT CHANGE UP (ref 34.5–45)
HGB BLD-MCNC: 13.6 G/DL — SIGNIFICANT CHANGE UP (ref 11.5–15.5)
IMM GRANULOCYTES NFR BLD AUTO: 0.4 % — SIGNIFICANT CHANGE UP (ref 0–1.5)
INR BLD: 0.98 — SIGNIFICANT CHANGE UP (ref 0.88–1.17)
LYMPHOCYTES # BLD AUTO: 0.85 K/UL — LOW (ref 1–3.3)
LYMPHOCYTES # BLD AUTO: 6.9 % — LOW (ref 13–44)
MCHC RBC-ENTMCNC: 28.9 PG — SIGNIFICANT CHANGE UP (ref 27–34)
MCHC RBC-ENTMCNC: 33.3 % — SIGNIFICANT CHANGE UP (ref 32–36)
MCV RBC AUTO: 86.8 FL — SIGNIFICANT CHANGE UP (ref 80–100)
MONOCYTES # BLD AUTO: 0.2 K/UL — SIGNIFICANT CHANGE UP (ref 0–0.9)
MONOCYTES NFR BLD AUTO: 1.6 % — LOW (ref 2–14)
NEUTROPHILS # BLD AUTO: 11.15 K/UL — HIGH (ref 1.8–7.4)
NEUTROPHILS NFR BLD AUTO: 91 % — HIGH (ref 43–77)
NRBC # FLD: 0 K/UL — SIGNIFICANT CHANGE UP (ref 0–0)
PLATELET # BLD AUTO: 307 K/UL — SIGNIFICANT CHANGE UP (ref 150–400)
PMV BLD: 9.2 FL — SIGNIFICANT CHANGE UP (ref 7–13)
POTASSIUM SERPL-MCNC: 4.1 MMOL/L — SIGNIFICANT CHANGE UP (ref 3.5–5.3)
POTASSIUM SERPL-SCNC: 4.1 MMOL/L — SIGNIFICANT CHANGE UP (ref 3.5–5.3)
PROT SERPL-MCNC: 7.9 G/DL — SIGNIFICANT CHANGE UP (ref 6–8.3)
PROTHROM AB SERPL-ACNC: 11.2 SEC — SIGNIFICANT CHANGE UP (ref 9.8–13.1)
RBC # BLD: 4.7 M/UL — SIGNIFICANT CHANGE UP (ref 3.8–5.2)
RBC # FLD: 11.7 % — SIGNIFICANT CHANGE UP (ref 10.3–14.5)
SODIUM SERPL-SCNC: 140 MMOL/L — SIGNIFICANT CHANGE UP (ref 135–145)
WBC # BLD: 12.26 K/UL — HIGH (ref 3.8–10.5)
WBC # FLD AUTO: 12.26 K/UL — HIGH (ref 3.8–10.5)

## 2019-10-16 PROCEDURE — 88305 TISSUE EXAM BY PATHOLOGIST: CPT | Mod: 26

## 2019-10-16 PROCEDURE — 13102 CMPLX RPR TRUNK ADDL 5CM/<: CPT

## 2019-10-16 PROCEDURE — 88342 IMHCHEM/IMCYTCHM 1ST ANTB: CPT | Mod: 26

## 2019-10-16 PROCEDURE — 38525 BIOPSY/REMOVAL LYMPH NODES: CPT

## 2019-10-16 PROCEDURE — 13101 CMPLX RPR TRUNK 2.6-7.5 CM: CPT

## 2019-10-16 PROCEDURE — 78195 LYMPH SYSTEM IMAGING: CPT | Mod: 26

## 2019-10-16 PROCEDURE — 11606 EXC TR-EXT MAL+MARG >4 CM: CPT

## 2019-10-16 PROCEDURE — 88307 TISSUE EXAM BY PATHOLOGIST: CPT | Mod: 26

## 2019-10-16 RX ORDER — OXYCODONE HYDROCHLORIDE 5 MG/1
5 TABLET ORAL ONCE
Refills: 0 | Status: DISCONTINUED | OUTPATIENT
Start: 2019-10-16 | End: 2019-10-17

## 2019-10-16 RX ORDER — ACETAMINOPHEN 500 MG
650 TABLET ORAL EVERY 6 HOURS
Refills: 0 | Status: DISCONTINUED | OUTPATIENT
Start: 2019-10-16 | End: 2019-10-17

## 2019-10-16 RX ORDER — SODIUM CHLORIDE 9 MG/ML
1000 INJECTION, SOLUTION INTRAVENOUS
Refills: 0 | Status: DISCONTINUED | OUTPATIENT
Start: 2019-10-16 | End: 2019-10-17

## 2019-10-16 RX ORDER — SODIUM CHLORIDE 9 MG/ML
1000 INJECTION, SOLUTION INTRAVENOUS
Refills: 0 | Status: DISCONTINUED | OUTPATIENT
Start: 2019-10-16 | End: 2019-11-04

## 2019-10-16 RX ORDER — HYDRALAZINE HCL 50 MG
10 TABLET ORAL ONCE
Refills: 0 | Status: COMPLETED | OUTPATIENT
Start: 2019-10-16 | End: 2019-10-16

## 2019-10-16 RX ORDER — ONDANSETRON 8 MG/1
4 TABLET, FILM COATED ORAL EVERY 6 HOURS
Refills: 0 | Status: DISCONTINUED | OUTPATIENT
Start: 2019-10-16 | End: 2019-10-17

## 2019-10-16 RX ORDER — OXYCODONE HYDROCHLORIDE 5 MG/1
5 TABLET ORAL ONCE
Refills: 0 | Status: DISCONTINUED | OUTPATIENT
Start: 2019-10-16 | End: 2019-10-16

## 2019-10-16 RX ORDER — FENTANYL CITRATE 50 UG/ML
25 INJECTION INTRAVENOUS
Refills: 0 | Status: DISCONTINUED | OUTPATIENT
Start: 2019-10-16 | End: 2019-10-16

## 2019-10-16 RX ORDER — ONDANSETRON 8 MG/1
4 TABLET, FILM COATED ORAL ONCE
Refills: 0 | Status: DISCONTINUED | OUTPATIENT
Start: 2019-10-16 | End: 2019-11-04

## 2019-10-16 RX ORDER — LOSARTAN POTASSIUM 100 MG/1
100 TABLET, FILM COATED ORAL DAILY
Refills: 0 | Status: DISCONTINUED | OUTPATIENT
Start: 2019-10-16 | End: 2019-10-17

## 2019-10-16 RX ADMIN — Medication 650 MILLIGRAM(S): at 23:50

## 2019-10-16 RX ADMIN — SODIUM CHLORIDE 125 MILLILITER(S): 9 INJECTION, SOLUTION INTRAVENOUS at 23:48

## 2019-10-16 RX ADMIN — Medication 10 MILLIGRAM(S): at 23:24

## 2019-10-16 RX ADMIN — OXYCODONE HYDROCHLORIDE 5 MILLIGRAM(S): 5 TABLET ORAL at 14:27

## 2019-10-16 RX ADMIN — OXYCODONE HYDROCHLORIDE 5 MILLIGRAM(S): 5 TABLET ORAL at 14:00

## 2019-10-16 RX ADMIN — SODIUM CHLORIDE 100 MILLILITER(S): 9 INJECTION, SOLUTION INTRAVENOUS at 14:00

## 2019-10-16 NOTE — ASU DISCHARGE PLAN (ADULT/PEDIATRIC) - NURSING INSTRUCTIONS
DO NOT take any Tylenol (Acetaminophen) or narcotics containing Tylenol until after  5:00pm. You received Tylenol during your operation and it can cause damage to your liver if too much is taken within a 24 hour time period.

## 2019-10-16 NOTE — ED PROVIDER NOTE - MUSCULOSKELETAL MINIMAL EXAM
+large  hematoma overlying T spine (~[id number]) reducible at the edges, firm in the middle. Trace bleeding from surgical bandages

## 2019-10-16 NOTE — ASU DISCHARGE PLAN (ADULT/PEDIATRIC) - ASU DC SPECIAL INSTRUCTIONSFT
You may shower on 24 hours.  Please leave steri-strips in place.  Do not rub or scrub over the incision, pat dry gently.  You may take over-the-counter medication for pain.    Please follow-up with Dr. Oleary and Dr. Graff in 2 weeks.  Please call their offices to confirm your appointment.

## 2019-10-16 NOTE — BRIEF OPERATIVE NOTE - OPERATION/FINDINGS
patient was positioned supine, Left axilla was entered via incision at inferior border of hair bearing region. Sentinal lymph node was identified via gamma probe and removed, no active nodes left behind. Axilla closed in 3 layers.    Patient was repositioned to lateral decubitus left side up. Back melanoma was identified excised with 2 cm margins. Closure performed by plastics
conversion of back defect to ellipse, complex closure

## 2019-10-16 NOTE — ED PROVIDER NOTE - CARE PLAN
Principal Discharge DX:	Hematoma  Secondary Diagnosis:	Malignant melanoma in situ Principal Discharge DX:	Post-operative complication  Secondary Diagnosis:	Malignant melanoma in situ

## 2019-10-16 NOTE — ASU DISCHARGE PLAN (ADULT/PEDIATRIC) - CALL YOUR DOCTOR IF YOU HAVE ANY OF THE FOLLOWING:
Fever greater than (need to indicate Fahrenheit or Celsius)/Bleeding that does not stop/Pain not relieved by Medications Pain not relieved by Medications/Unable to urinate/Increased irritability or sluggishness/Numbness, tingling, color or temperature change to extremity/Bleeding that does not stop/Fever greater than (need to indicate Fahrenheit or Celsius)/Wound/Surgical Site with redness, or foul smelling discharge or pus/Swelling that gets worse

## 2019-10-16 NOTE — CONSULT NOTE ADULT - SUBJECTIVE AND OBJECTIVE BOX
Patient is a 64 year old woman with history of hypertension and melanoma of back. Had wide local excision by surgical oncology (Dr. Oleary) and closure by plastic surgery (Dr. Graff) earlier today. Normal post operative course. Noticed bloody drainage on dressing when she got home from hospital. Has post-operative nausea and emesis x 3, large emesis at around 7pm. Noticed swelling and pain of surgical sight around dinner time. Presented to ED after contacting her surgeon. Of note, BP ~200/100 in ED triage. Normally takes losartan for HTN. Denies lightheaded, tachycardia, SOB, CP, visual changes.     PAST MEDICAL & SURGICAL HISTORY:  Malignant melanoma in situ  Obesity  Hypertension  H/O knee surgery: as teen  - open tendon repair  H/O spinal fusion:  C4-5  H/O  section:  &amp;     ROS: Negative except as per HPI.    Vital Signs Last 24 Hrs  T(C): 36.6 (16 Oct 2019 22:05), Max: 36.9 (16 Oct 2019 06:11)  T(F): 97.9 (16 Oct 2019 22:05), Max: 98.4 (16 Oct 2019 06:11)  HR: 69 (16 Oct 2019 22:51) (56 - 76)  BP: 211/96 (16 Oct 2019 23:25) (107/55 - 211/96)  BP(mean): 86 (16 Oct 2019 14:30) (66 - 96)  RR: 16 (16 Oct 2019 22:51) (12 - 20)  SpO2: 97% (16 Oct 2019 22:51) (95% - 100%)    Exam:  Alert, no distress, responding appropriately  ~300-400cc subcutaneous collection at surgical site.  Incision intact. Minimal drainage from incision. Supple soft tissue around collection, no evidence of active expansion.   No pain at incision site while at rest. Mild pain with palpation.

## 2019-10-16 NOTE — ED PROVIDER NOTE - OBJECTIVE STATEMENT
Pt is a 62y/o F with PMHx of melanoma and HTN p/w complications from surgery  Pt presented for scheduled outpatient resection of melanoma with LN biopsy. Pt reports feeling okay postprocedure and was discharged home. This evening she woke up and had significant HA, dizziness, nausea and vomiting. She then noticed bleeding from incision site and called her oncologist her sent her to the ED. Pt endorses back pain, and stomach pain, but no fever/chills, CP, SOB, diarrhea, confusion, or weakness.

## 2019-10-16 NOTE — ED ADULT NURSE NOTE - OBJECTIVE STATEMENT
marleny RN: pt received to room 25. complains of n/v (3 times) and severe frontal headache. states had bleeding from surgical site. had melanoma removed from upped back today. large incision to back. dried blood to steristrips and around site but no active bleeding. also has dressing below left axilla. states had 2 lymph nodes removed as well today. this dressing clean/dry and intact. pt states take losartan for BP, took it in the AM. states believed percocet made her feel nauseous. NSR on monitor. awaits md peters in NAD with son at bedside. marleny RN: pt received to room 25. complains of n/v (3 times) and severe frontal headache. states had bleeding from surgical site. had melanoma removed from upped back today. large incision to back. dried blood to steristrips and around site but no active bleeding. also has dressing below left axilla. states had 2 lymph nodes removed as well today. this dressing clean/dry and intact. pt states take losartan for BP, took it in the AM. states believed percocet made her feel nauseous. NSR on monitor. awaits md peters in NAD with son at bedside. endorsed to primary JUAN J Coffey.

## 2019-10-16 NOTE — ED ADULT NURSE NOTE - IS THE PATIENT ABLE TO BE SCREENED?
Detail Level: Zone Text: The above diagnosis and findings were noted on the exam but not discussed at this time with the patient. Yes

## 2019-10-16 NOTE — BRIEF OPERATIVE NOTE - NSICDXBRIEFPROCEDURE_GEN_ALL_CORE_FT
PROCEDURES:  Biopsy, lymph node, axillary, deep 16-Oct-2019 12:09:47  Kinjal Medina  Excision of malignant lesion of back 3.6 to 4.0cm in diameter, including margins 16-Oct-2019 12:09:28  Kinjal Medina
PROCEDURES:  Repair, back, complex, 5.1 cm to 7.5 cm 16-Oct-2019 12:43:34  Freddie Watts

## 2019-10-16 NOTE — ED PROVIDER NOTE - ATTENDING CONTRIBUTION TO CARE
I performed a face-to-face evaluation of the patient and performed a history and physical examination. I agree with the history and physical examination.    S/p back surgery for melanoma. C/o bleeding from site today. No current bleeding (likely self-tamponaded). Check CBC. Admit.

## 2019-10-16 NOTE — ED ADULT TRIAGE NOTE - CHIEF COMPLAINT QUOTE
Pt c/o bleeding s/p melanoma and lymph node removal today.  Pt denies any use of blood thinners.  Pt appears pale.  Endorses hx of transfusion but states it was planned for during surgery.  Pt noted to have elevated BP, endorses HA but denies any vision changes.  Endorses nausea. PMHx:  high cholesterol

## 2019-10-16 NOTE — CONSULT NOTE ADULT - ASSESSMENT
65yo woman s/p back melanoma excision and closure now with non-expanding hematoma ~300-400cc. Stable collection size, no evidence of hemdynamic instability. Will require OR for washout, but last ate 3 hrs ago.    - Pre-op labs  - IV fluids  - NPO  - Add-on for OR in AM.   - Discussed with attending, Dr. Javad Graff

## 2019-10-16 NOTE — ED PROVIDER NOTE - CLINICAL SUMMARY MEDICAL DECISION MAKING FREE TEXT BOX
63F s/p melanoma section p/w large hematoma around surgical site. Compression wraps placed. Will admit to plastic surgery for OR in the morning

## 2019-10-17 VITALS
HEART RATE: 77 BPM | RESPIRATION RATE: 14 BRPM | SYSTOLIC BLOOD PRESSURE: 131 MMHG | DIASTOLIC BLOOD PRESSURE: 53 MMHG | OXYGEN SATURATION: 95 %

## 2019-10-17 PROBLEM — E66.9 OBESITY, UNSPECIFIED: Chronic | Status: ACTIVE | Noted: 2019-10-03

## 2019-10-17 PROBLEM — D03.9 MELANOMA IN SITU, UNSPECIFIED: Chronic | Status: ACTIVE | Noted: 2019-10-03

## 2019-10-17 LAB
BLD GP AB SCN SERPL QL: NEGATIVE — SIGNIFICANT CHANGE UP
BLD GP AB SCN SERPL QL: NEGATIVE — SIGNIFICANT CHANGE UP
RH IG SCN BLD-IMP: NEGATIVE — SIGNIFICANT CHANGE UP
RH IG SCN BLD-IMP: NEGATIVE — SIGNIFICANT CHANGE UP

## 2019-10-17 PROCEDURE — 10140 I&D HMTMA SEROMA/FLUID COLLJ: CPT

## 2019-10-17 RX ORDER — OXYCODONE HYDROCHLORIDE 5 MG/1
1 TABLET ORAL
Qty: 0 | Refills: 0 | DISCHARGE
Start: 2019-10-17

## 2019-10-17 RX ORDER — LABETALOL HCL 100 MG
20 TABLET ORAL ONCE
Refills: 0 | Status: COMPLETED | OUTPATIENT
Start: 2019-10-17 | End: 2019-10-17

## 2019-10-17 RX ORDER — INFLUENZA VIRUS VACCINE 15; 15; 15; 15 UG/.5ML; UG/.5ML; UG/.5ML; UG/.5ML
0.5 SUSPENSION INTRAMUSCULAR ONCE
Refills: 0 | Status: DISCONTINUED | OUTPATIENT
Start: 2019-10-17 | End: 2019-10-17

## 2019-10-17 RX ORDER — ACETAMINOPHEN 500 MG
2 TABLET ORAL
Qty: 0 | Refills: 0 | DISCHARGE
Start: 2019-10-17

## 2019-10-17 RX ORDER — AZTREONAM 2 G
1 VIAL (EA) INJECTION
Qty: 3 | Refills: 0
Start: 2019-10-17 | End: 2019-10-19

## 2019-10-17 RX ADMIN — ONDANSETRON 4 MILLIGRAM(S): 8 TABLET, FILM COATED ORAL at 12:31

## 2019-10-17 RX ADMIN — Medication 20 MILLIGRAM(S): at 03:06

## 2019-10-17 RX ADMIN — LOSARTAN POTASSIUM 100 MILLIGRAM(S): 100 TABLET, FILM COATED ORAL at 06:55

## 2019-10-17 RX ADMIN — ONDANSETRON 4 MILLIGRAM(S): 8 TABLET, FILM COATED ORAL at 01:47

## 2019-10-17 NOTE — ASU DISCHARGE PLAN (ADULT/PEDIATRIC) - CALL YOUR DOCTOR IF YOU HAVE ANY OF THE FOLLOWING:
Wound/Surgical Site with redness, or foul smelling discharge or pus/Pain not relieved by Medications/Bleeding that does not stop/Fever greater than (need to indicate Fahrenheit or Celsius) Unable to urinate/Fever greater than (need to indicate Fahrenheit or Celsius)/Wound/Surgical Site with redness, or foul smelling discharge or pus/Nausea and vomiting that does not stop/Swelling that gets worse/Bleeding that does not stop/Pain not relieved by Medications

## 2019-10-17 NOTE — ASU DISCHARGE PLAN (ADULT/PEDIATRIC) - ASU DC SPECIAL INSTRUCTIONSFT
For wound care:  You may shower starting tomorrow morning but please do not scrub or directly rub over your incision and pat to dry.      For pain control you can take the same medications that were previously prescribed for you.      Please take the prescribed antibiotic for a total of 3 days.       Please follow up with Dr. Graff in 1 week.  You may call [phone] to schedule this appointment.

## 2019-10-17 NOTE — PATIENT PROFILE ADULT - STATED REASON FOR ADMISSION
pt states that she had an outpatient procedure done and she went home and noticed a lot of bloody drainage so she came to the ED

## 2019-10-17 NOTE — ASU DISCHARGE PLAN (ADULT/PEDIATRIC) - CARE PROVIDER_API CALL
Javad Graff)  Plastic Surgery  [address], [address]  [place] [address]  Phone: [phone]  Fax: [phone]  Follow Up Time:

## 2019-10-17 NOTE — ASU DISCHARGE PLAN (ADULT/PEDIATRIC) - NURSING INSTRUCTIONS
No creams, lotions, powders  or ointments to incision site. No creams, lotions, powders  or ointments to incision site. ISIDORO teaching instruction given .

## 2019-10-22 ENCOUNTER — APPOINTMENT (OUTPATIENT)
Dept: PLASTIC SURGERY | Facility: CLINIC | Age: 64
End: 2019-10-22
Payer: COMMERCIAL

## 2019-10-22 PROCEDURE — 99024 POSTOP FOLLOW-UP VISIT: CPT

## 2019-10-24 NOTE — HISTORY OF PRESENT ILLNESS
[FreeTextEntry1] : Ms. Blount is a 64 y/o female presents for follow up visist s/p WLE of  a nodular lesion of the left back and complex closure. Post op  developed hematoma return to OR for evacuation. Patient doing well.\par \par \par \par . She underwent biopsy 09/10/2019 by dermatology which demonstrated a 1.7 mm nodular melanoma with mitotic rate 8/mm2, but without ulceration or regression. She is referred for surgical management.\par Patient believes lesion has been present for months but denies associated pain, bleeding, ulceration. She has no history of cutaneous malignancy.

## 2019-10-24 NOTE — PHYSICAL EXAM
[NI] : Normal [de-identified] : incision c/d/i healing well no erythema no sign of infection drain removed

## 2019-10-24 NOTE — REASON FOR VISIT
[Post Op: _________] : a [unfilled] post op visit [FreeTextEntry1] : DOS: 10/16/2019 s/p local tissue rearrangement following wide excision of melanoma located on the left back. Pt states she is doing better with time.

## 2019-10-29 LAB — SURGICAL PATHOLOGY STUDY: SIGNIFICANT CHANGE UP

## 2019-11-05 ENCOUNTER — APPOINTMENT (OUTPATIENT)
Dept: PLASTIC SURGERY | Facility: CLINIC | Age: 64
End: 2019-11-05
Payer: COMMERCIAL

## 2019-11-05 PROCEDURE — 99024 POSTOP FOLLOW-UP VISIT: CPT

## 2019-11-07 NOTE — HISTORY OF PRESENT ILLNESS
[FreeTextEntry1] : Ms. Blount is a 62 y/o female presents for follow up visist s/p WLE of  a nodular lesion of the left back and complex closure. Post op  developed hematoma return to OR for evacuation. Patient doing well.\par \par \par \par . She underwent biopsy 09/10/2019 by dermatology which demonstrated a 1.7 mm nodular melanoma with mitotic rate 8/mm2, but without ulceration or regression. She is referred for surgical management.\par Patient believes lesion has been present for months but denies associated pain, bleeding, ulceration. She has no history of cutaneous malignancy.

## 2019-12-16 ENCOUNTER — APPOINTMENT (OUTPATIENT)
Dept: DERMATOLOGY | Facility: CLINIC | Age: 64
End: 2019-12-16
Payer: COMMERCIAL

## 2019-12-16 PROCEDURE — 99214 OFFICE O/P EST MOD 30 MIN: CPT

## 2019-12-31 ENCOUNTER — APPOINTMENT (OUTPATIENT)
Dept: MAMMOGRAPHY | Facility: CLINIC | Age: 64
End: 2019-12-31
Payer: COMMERCIAL

## 2019-12-31 ENCOUNTER — OUTPATIENT (OUTPATIENT)
Dept: OUTPATIENT SERVICES | Facility: HOSPITAL | Age: 64
LOS: 1 days | End: 2019-12-31
Payer: COMMERCIAL

## 2019-12-31 ENCOUNTER — APPOINTMENT (OUTPATIENT)
Dept: ULTRASOUND IMAGING | Facility: CLINIC | Age: 64
End: 2019-12-31
Payer: COMMERCIAL

## 2019-12-31 DIAGNOSIS — Z98.891 HISTORY OF UTERINE SCAR FROM PREVIOUS SURGERY: Chronic | ICD-10-CM

## 2019-12-31 DIAGNOSIS — Z98.890 OTHER SPECIFIED POSTPROCEDURAL STATES: Chronic | ICD-10-CM

## 2019-12-31 DIAGNOSIS — Z00.8 ENCOUNTER FOR OTHER GENERAL EXAMINATION: ICD-10-CM

## 2019-12-31 DIAGNOSIS — Z98.1 ARTHRODESIS STATUS: Chronic | ICD-10-CM

## 2019-12-31 PROCEDURE — 77067 SCR MAMMO BI INCL CAD: CPT | Mod: 26

## 2019-12-31 PROCEDURE — 77063 BREAST TOMOSYNTHESIS BI: CPT | Mod: 26

## 2019-12-31 PROCEDURE — 76641 ULTRASOUND BREAST COMPLETE: CPT | Mod: 26,50

## 2019-12-31 PROCEDURE — 77067 SCR MAMMO BI INCL CAD: CPT

## 2019-12-31 PROCEDURE — 76641 ULTRASOUND BREAST COMPLETE: CPT

## 2019-12-31 PROCEDURE — 77063 BREAST TOMOSYNTHESIS BI: CPT

## 2020-01-06 ENCOUNTER — APPOINTMENT (OUTPATIENT)
Dept: CARDIOLOGY | Facility: CLINIC | Age: 65
End: 2020-01-06

## 2020-01-13 ENCOUNTER — APPOINTMENT (OUTPATIENT)
Dept: FAMILY MEDICINE | Facility: CLINIC | Age: 65
End: 2020-01-13
Payer: COMMERCIAL

## 2020-01-13 VITALS
HEIGHT: 68 IN | RESPIRATION RATE: 12 BRPM | HEART RATE: 73 BPM | TEMPERATURE: 98.2 F | WEIGHT: 192 LBS | DIASTOLIC BLOOD PRESSURE: 80 MMHG | OXYGEN SATURATION: 98 % | SYSTOLIC BLOOD PRESSURE: 140 MMHG | BODY MASS INDEX: 29.1 KG/M2

## 2020-01-13 DIAGNOSIS — H53.9 UNSPECIFIED VISUAL DISTURBANCE: ICD-10-CM

## 2020-01-13 PROCEDURE — 99202 OFFICE O/P NEW SF 15 MIN: CPT

## 2020-01-13 RX ORDER — LATANOPROST/PF 0.005 %
0.01 DROPS OPHTHALMIC (EYE)
Qty: 1 | Refills: 5 | Status: ACTIVE | COMMUNITY
Start: 2020-01-13

## 2020-01-21 ENCOUNTER — RESULT REVIEW (OUTPATIENT)
Age: 65
End: 2020-01-21

## 2020-03-16 ENCOUNTER — APPOINTMENT (OUTPATIENT)
Dept: DERMATOLOGY | Facility: CLINIC | Age: 65
End: 2020-03-16
Payer: COMMERCIAL

## 2020-03-16 ENCOUNTER — LABORATORY RESULT (OUTPATIENT)
Age: 65
End: 2020-03-16

## 2020-03-16 VITALS — HEIGHT: 67 IN | BODY MASS INDEX: 29.03 KG/M2 | WEIGHT: 185 LBS

## 2020-03-16 PROCEDURE — 11102 TANGNTL BX SKIN SINGLE LES: CPT

## 2020-03-16 PROCEDURE — 99214 OFFICE O/P EST MOD 30 MIN: CPT | Mod: 25

## 2020-03-25 ENCOUNTER — APPOINTMENT (OUTPATIENT)
Dept: FAMILY MEDICINE | Facility: CLINIC | Age: 65
End: 2020-03-25

## 2020-06-15 ENCOUNTER — APPOINTMENT (OUTPATIENT)
Dept: DERMATOLOGY | Facility: CLINIC | Age: 65
End: 2020-06-15
Payer: COMMERCIAL

## 2020-06-15 VITALS — BODY MASS INDEX: 29.03 KG/M2 | HEIGHT: 67 IN | WEIGHT: 185 LBS

## 2020-06-15 DIAGNOSIS — L30.9 DERMATITIS, UNSPECIFIED: ICD-10-CM

## 2020-06-15 DIAGNOSIS — B35.3 TINEA PEDIS: ICD-10-CM

## 2020-06-15 PROCEDURE — 99214 OFFICE O/P EST MOD 30 MIN: CPT

## 2020-09-30 NOTE — PATIENT PROFILE ADULT - TRANSPORTATION
Patient calling again, per nurse, writer let patient know, we are still waiting on our MD and will call her back.  Patient voiced understanding.   no

## 2020-10-05 ENCOUNTER — APPOINTMENT (OUTPATIENT)
Dept: DERMATOLOGY | Facility: CLINIC | Age: 65
End: 2020-10-05
Payer: COMMERCIAL

## 2020-10-05 VITALS — TEMPERATURE: 98.6 F

## 2020-10-05 DIAGNOSIS — L82.1 OTHER SEBORRHEIC KERATOSIS: ICD-10-CM

## 2020-10-05 PROCEDURE — 99214 OFFICE O/P EST MOD 30 MIN: CPT

## 2020-10-27 ENCOUNTER — APPOINTMENT (OUTPATIENT)
Dept: FAMILY MEDICINE | Facility: CLINIC | Age: 65
End: 2020-10-27
Payer: COMMERCIAL

## 2020-10-27 DIAGNOSIS — Z23 ENCOUNTER FOR IMMUNIZATION: ICD-10-CM

## 2020-10-27 PROCEDURE — G0008: CPT

## 2020-10-27 PROCEDURE — 90686 IIV4 VACC NO PRSV 0.5 ML IM: CPT

## 2020-10-27 PROCEDURE — 99072 ADDL SUPL MATRL&STAF TM PHE: CPT

## 2020-12-16 ENCOUNTER — LABORATORY RESULT (OUTPATIENT)
Age: 65
End: 2020-12-16

## 2020-12-22 ENCOUNTER — NON-APPOINTMENT (OUTPATIENT)
Age: 65
End: 2020-12-22

## 2020-12-22 ENCOUNTER — APPOINTMENT (OUTPATIENT)
Dept: FAMILY MEDICINE | Facility: CLINIC | Age: 65
End: 2020-12-22
Payer: COMMERCIAL

## 2020-12-22 VITALS
TEMPERATURE: 98.6 F | HEIGHT: 67 IN | DIASTOLIC BLOOD PRESSURE: 80 MMHG | RESPIRATION RATE: 12 BRPM | WEIGHT: 186 LBS | BODY MASS INDEX: 29.19 KG/M2 | HEART RATE: 68 BPM | OXYGEN SATURATION: 98 % | SYSTOLIC BLOOD PRESSURE: 160 MMHG

## 2020-12-22 PROCEDURE — 93000 ELECTROCARDIOGRAM COMPLETE: CPT | Mod: 59

## 2020-12-22 PROCEDURE — 99072 ADDL SUPL MATRL&STAF TM PHE: CPT

## 2020-12-22 PROCEDURE — 99396 PREV VISIT EST AGE 40-64: CPT | Mod: 25

## 2020-12-22 NOTE — ASSESSMENT
[FreeTextEntry1] : Discussed CPE labs with patient, any abnormal results addressed and patient asked questions and understood discussion and any further workup if necessary.\par  EKG normal sinus rhythm no acute changes\par UTD with colonoscopy\par Mammo scheduled for this week

## 2020-12-22 NOTE — HISTORY OF PRESENT ILLNESS
[FreeTextEntry1] : CPE [de-identified] : SOHAM MCNEAL 65 year F with PMHx HTN presents for wellness exam. Doing well at this time. Eats well-balanced diet. Tries to exercise. Taking care of her elderly mother who has dementia. Occasionally feels stressed and flushed. Recently bought a BP machine but has not started to measure her BP yet. \par

## 2020-12-28 ENCOUNTER — RESULT REVIEW (OUTPATIENT)
Age: 65
End: 2020-12-28

## 2020-12-31 ENCOUNTER — APPOINTMENT (OUTPATIENT)
Dept: ULTRASOUND IMAGING | Facility: CLINIC | Age: 65
End: 2020-12-31
Payer: COMMERCIAL

## 2020-12-31 ENCOUNTER — APPOINTMENT (OUTPATIENT)
Dept: MAMMOGRAPHY | Facility: CLINIC | Age: 65
End: 2020-12-31
Payer: COMMERCIAL

## 2020-12-31 ENCOUNTER — OUTPATIENT (OUTPATIENT)
Dept: OUTPATIENT SERVICES | Facility: HOSPITAL | Age: 65
LOS: 1 days | End: 2020-12-31
Payer: COMMERCIAL

## 2020-12-31 DIAGNOSIS — Z12.31 ENCOUNTER FOR SCREENING MAMMOGRAM FOR MALIGNANT NEOPLASM OF BREAST: ICD-10-CM

## 2020-12-31 DIAGNOSIS — R92.2 INCONCLUSIVE MAMMOGRAM: ICD-10-CM

## 2020-12-31 DIAGNOSIS — Z98.891 HISTORY OF UTERINE SCAR FROM PREVIOUS SURGERY: Chronic | ICD-10-CM

## 2020-12-31 DIAGNOSIS — Z98.890 OTHER SPECIFIED POSTPROCEDURAL STATES: Chronic | ICD-10-CM

## 2020-12-31 DIAGNOSIS — Z98.1 ARTHRODESIS STATUS: Chronic | ICD-10-CM

## 2020-12-31 PROCEDURE — 77067 SCR MAMMO BI INCL CAD: CPT | Mod: 26

## 2020-12-31 PROCEDURE — 77067 SCR MAMMO BI INCL CAD: CPT

## 2020-12-31 PROCEDURE — 76641 ULTRASOUND BREAST COMPLETE: CPT

## 2020-12-31 PROCEDURE — 77063 BREAST TOMOSYNTHESIS BI: CPT | Mod: 26

## 2020-12-31 PROCEDURE — 77063 BREAST TOMOSYNTHESIS BI: CPT

## 2020-12-31 PROCEDURE — 76641 ULTRASOUND BREAST COMPLETE: CPT | Mod: 26,50

## 2021-01-20 ENCOUNTER — APPOINTMENT (OUTPATIENT)
Dept: FAMILY MEDICINE | Facility: CLINIC | Age: 66
End: 2021-01-20
Payer: COMMERCIAL

## 2021-01-20 VITALS
DIASTOLIC BLOOD PRESSURE: 76 MMHG | TEMPERATURE: 98.4 F | SYSTOLIC BLOOD PRESSURE: 126 MMHG | HEART RATE: 67 BPM | OXYGEN SATURATION: 98 %

## 2021-01-20 VITALS — DIASTOLIC BLOOD PRESSURE: 70 MMHG | SYSTOLIC BLOOD PRESSURE: 124 MMHG

## 2021-01-20 PROCEDURE — 99213 OFFICE O/P EST LOW 20 MIN: CPT

## 2021-01-20 PROCEDURE — 99072 ADDL SUPL MATRL&STAF TM PHE: CPT

## 2021-02-08 ENCOUNTER — APPOINTMENT (OUTPATIENT)
Dept: DERMATOLOGY | Facility: CLINIC | Age: 66
End: 2021-02-08
Payer: COMMERCIAL

## 2021-02-08 DIAGNOSIS — L70.8 OTHER ACNE: ICD-10-CM

## 2021-02-08 DIAGNOSIS — L85.3 XEROSIS CUTIS: ICD-10-CM

## 2021-02-08 PROCEDURE — 99072 ADDL SUPL MATRL&STAF TM PHE: CPT

## 2021-02-08 PROCEDURE — 99213 OFFICE O/P EST LOW 20 MIN: CPT

## 2021-02-23 ENCOUNTER — APPOINTMENT (OUTPATIENT)
Dept: ORTHOPEDIC SURGERY | Facility: CLINIC | Age: 66
End: 2021-02-23
Payer: COMMERCIAL

## 2021-02-23 PROCEDURE — 99072 ADDL SUPL MATRL&STAF TM PHE: CPT

## 2021-02-23 PROCEDURE — 99214 OFFICE O/P EST MOD 30 MIN: CPT

## 2021-02-23 PROCEDURE — 73564 X-RAY EXAM KNEE 4 OR MORE: CPT | Mod: RT

## 2021-02-23 NOTE — HISTORY OF PRESENT ILLNESS
[de-identified] : This is very nice 65-year-old female experiencing chronic right knee pain, which is severe in intensity. The pain substantially limits activities of daily living. Walking tolerance is reduced. Medication including NSAIDs and Voltaren and activity modification have been minimally effective for a period lasting greater than three months in duration.  She also tried a right knee intra-articular cortisone injection several months ago which helped for short period of time.  History of multiligament is knee injury as a child.  History of melanoma in her back in 2019 now treated.  Assistive devices and external support were not deemed by the patient to be helpful in improving their function. Due to the severity of osteoarthritis and level of pain, physical therapy is contraindicated. Pain and restriction of function are intolerable at this time. The patient denies any radiation of the pain to the feet and it is not associated with numbness, tingling, or weakness.

## 2021-02-23 NOTE — PHYSICAL EXAM
[de-identified] : Patient is well nourished, well-developed, in no acute distress, with appropriate mood and affect. The patient is oriented to time, place, and person. Respirations are even and unlabored. Gait evaluation does reveal a limp. There is no inguinal adenopathy. Examination of the contralateral knee shows normal range of motion, strength, no tenderness, and intact skin. The affected limb is well-perfused, without skin lesions, shows a grossly normal motor and sensory examination.  A slightly medial longitudinal incision is noted which is well-healed.  Knee motion is significantly reduced and does cause significant pain. The knee moves from  degrees. The knee is stable within that range-of-motion to AP and ML stress. The alignment of the knee is 5 degrees of valgus. Muscle strength is normal. Pedal pulses are palpable. Hip examination was negative.\par  [de-identified] : Long standing knee, AP knee, lateral knee, and patellar views of the right knee were ordered and taken in the office and demonstrate degenerative joint disease of the knee with joint space narrowing, osteophyte formation, and subchondral sclerosis.

## 2021-02-23 NOTE — DISCUSSION/SUMMARY
[de-identified] : This patient has right knee osteoarthritis.  She has failed a course of conservative management and would like to proceed with a right total knee arthroplasty using robotic navigation assistance.  Given her severe contracture a robotic navigation will be required to assist in the surgery.\par \par The patient is an appropriate candidate for consideration of right total knee replacement. An extensive discussion was conducted of the natural history of the disease and the variety of surgical and non-surgical treatment options available to the patient. A risk/benefit analysis was discussed with the patient reviewing the advantages and disadvantages of surgical intervention at this time. Both the level and length of the patient's pain have made additional conservative treatment measures consisting of NSAIDs, physical therapy, corticosteroids, and/or viscosupplementation contraindicated. A full explanation was given of the nature and the purpose of the procedure and anesthesia, its benefits, possible alternative methods of diagnosis or treatment, the risks involved, the possibility of complications, the foreseeable consequences of the procedure and the possible results of the non-treatment. I reviewed the plan of care as well as used a model of a total knee implant equivalent to the one that will be used for their total knee joint replacement. The ability to secure the implant utilizing cement or cementless (press-fit) was discussed with the patient. The patient agrees with the plan of care, as well as the use of implants for their total knee replacement.   We also discussed that if robotic/computer navigation is utilized, then additional incisions may need to be made to accommodate the computer navigation arrays, which will be placed in the femur and tibia.\par \par No guarantee or assurance was made as to the results that may be obtained. Specifically, the risks were identified to include, but are not limited to the following: Infection, phlebitis, pulmonary embolism, death, paralysis, dislocation, pain, stiffness, instability, limp, weakness, breakage, leg-length inequality, uncontrolled bleeding, nerve injury, blood vessel injury, pressure sores, anesthetic risks, delayed healing of wound and bone, and wear and loosening. Further discussion was undertaken with the patient about the details of surgical preparation, treatment, and postoperative rehabilitation including medical clearance, autotransfusion, the hospital course, and the postoperative rehabilitation involved. All in all, I feel that this patient is a good candidate for surgical reconstruction.\par \par The patient and I discussed the current SARS-CoV-2 (COVID-19) pandemic which has affected our local hospitals. We discussed that our hospitals treat patients with COVID-19. All efforts will be made to avoid cohorting the patient with diagnosed or suspected COVID-19 patient. They also understand that we will screen them 24-48 hours prior to surgery. Despite our best efforts, there is a potential risk for iatrogenic transmission of COVID-19 to the patient during the perioperative period. Shonda COVID-19 during the perioperative period may increase the patient´s risks of an adverse outcome including postoperative pneumonia, difficulty breathing, requirement for a breathing tube (general endotracheal intubation), and death. The patient is understanding of this risk, and is willing to proceed with surgery at this time.\par \par I did explain to the patient that their range of motion may not significantly improve after the surgery given the preoperative contracture. The patient expressed understanding of this and has elected to undergo total knee arthroplasty.

## 2021-02-25 ENCOUNTER — NON-APPOINTMENT (OUTPATIENT)
Age: 66
End: 2021-02-25

## 2021-02-26 ENCOUNTER — OUTPATIENT (OUTPATIENT)
Dept: OUTPATIENT SERVICES | Facility: HOSPITAL | Age: 66
LOS: 1 days | Discharge: HOME | End: 2021-02-26
Payer: COMMERCIAL

## 2021-02-26 ENCOUNTER — APPOINTMENT (OUTPATIENT)
Dept: CT IMAGING | Facility: CLINIC | Age: 66
End: 2021-02-26
Payer: COMMERCIAL

## 2021-02-26 ENCOUNTER — RESULT REVIEW (OUTPATIENT)
Age: 66
End: 2021-02-26

## 2021-02-26 ENCOUNTER — OUTPATIENT (OUTPATIENT)
Dept: OUTPATIENT SERVICES | Facility: HOSPITAL | Age: 66
LOS: 1 days | End: 2021-02-26
Payer: COMMERCIAL

## 2021-02-26 VITALS
RESPIRATION RATE: 18 BRPM | DIASTOLIC BLOOD PRESSURE: 65 MMHG | SYSTOLIC BLOOD PRESSURE: 125 MMHG | HEART RATE: 68 BPM | HEIGHT: 67 IN | OXYGEN SATURATION: 99 % | WEIGHT: 192.24 LBS | TEMPERATURE: 98 F

## 2021-02-26 DIAGNOSIS — M17.11 UNILATERAL PRIMARY OSTEOARTHRITIS, RIGHT KNEE: ICD-10-CM

## 2021-02-26 DIAGNOSIS — Z98.891 HISTORY OF UTERINE SCAR FROM PREVIOUS SURGERY: Chronic | ICD-10-CM

## 2021-02-26 DIAGNOSIS — Z98.890 OTHER SPECIFIED POSTPROCEDURAL STATES: Chronic | ICD-10-CM

## 2021-02-26 DIAGNOSIS — Z01.818 ENCOUNTER FOR OTHER PREPROCEDURAL EXAMINATION: ICD-10-CM

## 2021-02-26 DIAGNOSIS — I10 ESSENTIAL (PRIMARY) HYPERTENSION: ICD-10-CM

## 2021-02-26 DIAGNOSIS — Z98.1 ARTHRODESIS STATUS: Chronic | ICD-10-CM

## 2021-02-26 DIAGNOSIS — Z98.890 OTHER SPECIFIED POSTPROCEDURAL STATES: ICD-10-CM

## 2021-02-26 DIAGNOSIS — C43.9 MALIGNANT MELANOMA OF SKIN, UNSPECIFIED: ICD-10-CM

## 2021-02-26 LAB
ANION GAP SERPL CALC-SCNC: 7 MMOL/L — SIGNIFICANT CHANGE UP (ref 5–17)
APTT BLD: 32 SEC — SIGNIFICANT CHANGE UP (ref 27.5–35.5)
BLD GP AB SCN SERPL QL: SIGNIFICANT CHANGE UP
BUN SERPL-MCNC: 20 MG/DL — SIGNIFICANT CHANGE UP (ref 7–23)
CALCIUM SERPL-MCNC: 8.9 MG/DL — SIGNIFICANT CHANGE UP (ref 8.5–10.1)
CHLORIDE SERPL-SCNC: 103 MMOL/L — SIGNIFICANT CHANGE UP (ref 96–108)
CO2 SERPL-SCNC: 29 MMOL/L — SIGNIFICANT CHANGE UP (ref 22–31)
CREAT SERPL-MCNC: 0.76 MG/DL — SIGNIFICANT CHANGE UP (ref 0.5–1.3)
GLUCOSE SERPL-MCNC: 79 MG/DL — SIGNIFICANT CHANGE UP (ref 70–99)
HCT VFR BLD CALC: 39.2 % — SIGNIFICANT CHANGE UP (ref 34.5–45)
HGB BLD-MCNC: 13.5 G/DL — SIGNIFICANT CHANGE UP (ref 11.5–15.5)
INR BLD: 0.99 RATIO — SIGNIFICANT CHANGE UP (ref 0.88–1.16)
MCHC RBC-ENTMCNC: 30 PG — SIGNIFICANT CHANGE UP (ref 27–34)
MCHC RBC-ENTMCNC: 34.4 GM/DL — SIGNIFICANT CHANGE UP (ref 32–36)
MCV RBC AUTO: 87.1 FL — SIGNIFICANT CHANGE UP (ref 80–100)
NRBC # BLD: 0 /100 WBCS — SIGNIFICANT CHANGE UP (ref 0–0)
PLATELET # BLD AUTO: 304 K/UL — SIGNIFICANT CHANGE UP (ref 150–400)
POTASSIUM SERPL-MCNC: 3.3 MMOL/L — LOW (ref 3.5–5.3)
POTASSIUM SERPL-SCNC: 3.3 MMOL/L — LOW (ref 3.5–5.3)
PROTHROM AB SERPL-ACNC: 11.5 SEC — SIGNIFICANT CHANGE UP (ref 10.6–13.6)
RBC # BLD: 4.5 M/UL — SIGNIFICANT CHANGE UP (ref 3.8–5.2)
RBC # FLD: 11.8 % — SIGNIFICANT CHANGE UP (ref 10.3–14.5)
SODIUM SERPL-SCNC: 139 MMOL/L — SIGNIFICANT CHANGE UP (ref 135–145)
WBC # BLD: 9.66 K/UL — SIGNIFICANT CHANGE UP (ref 3.8–10.5)
WBC # FLD AUTO: 9.66 K/UL — SIGNIFICANT CHANGE UP (ref 3.8–10.5)

## 2021-02-26 PROCEDURE — 73700 CT LOWER EXTREMITY W/O DYE: CPT | Mod: 26,RT

## 2021-02-26 PROCEDURE — 93010 ELECTROCARDIOGRAM REPORT: CPT

## 2021-02-26 PROCEDURE — 73700 CT LOWER EXTREMITY W/O DYE: CPT

## 2021-02-26 RX ORDER — MUPIROCIN 20 MG/G
1 OINTMENT TOPICAL
Qty: 22 | Refills: 0
Start: 2021-02-26 | End: 2021-03-02

## 2021-02-26 RX ORDER — SODIUM CHLORIDE 9 MG/ML
3 INJECTION INTRAMUSCULAR; INTRAVENOUS; SUBCUTANEOUS EVERY 8 HOURS
Refills: 0 | Status: DISCONTINUED | OUTPATIENT
Start: 2021-03-05 | End: 2021-03-06

## 2021-02-26 NOTE — PHYSICAL THERAPY INITIAL EVALUATION ADULT - MODIFIED CLINICAL TEST OF SENSORY INTEGRATION IN BALANCE TEST
30 second Sit to Stand Test: (reps: 11, adaptation: uses armrest) ; One Leg Stand Test (OLST): Right: Unable to perform, Left: >30 secs

## 2021-02-26 NOTE — OCCUPATIONAL THERAPY INITIAL EVALUATION ADULT - PERTINENT HX OF CURRENT PROBLEM, REHAB EVAL
Pain and OA to right knee. Pt is scheduled for right total knee replacement on 3/5/21 with Dr. Bonilla.

## 2021-02-26 NOTE — H&P PST ADULT - ASSESSMENT
65F pmh htn, melanoma, increased ocular pressure c/o right knee pain 2/2 unilateral primary osteoarthritis here for PST for scheduled Right total knee arthroplasty robotic assisted  CAPRINI SCORE    AGE RELATED RISK FACTORS                                                       MOBILITY RELATED FACTORS  [ ] Age 41-60 years                                            (1 Point)                  [ ] Bed rest                                                        (1 Point)  [x ] Age: 61-74 years                                           (2 Points)                [ ] Plaster cast                                                   (2 Points)  [ ] Age= 75 years                                              (3 Points)                 [ ] Bed bound for more than 72 hours                   (2 Points)    DISEASE RELATED RISK FACTORS                                               GENDER SPECIFIC FACTORS  [ ] Edema in the lower extremities                       (1 Point)                  [ ] Pregnancy                                                     (1 Point)  [ ] Varicose veins                                               (1 Point)                  [ ] Post-partum < 6 weeks                                   (1 Point)             [ x] BMI > 25 Kg/m2                                            (1 Point)                  [ ] Hormonal therapy  or oral contraception            (1 Point)                 [ ] Sepsis (in the previous month)                        (1 Point)                  [ ] History of pregnancy complications  [ ] Pneumonia or serious lung disease                                               [ ] Unexplained or recurrent                       (1 Point)           (in the previous month)                               (1 Point)  [ ] Abnormal pulmonary function test                     (1 Point)                 SURGERY RELATED RISK FACTORS  [ ] Acute myocardial infarction                              (1 Point)                 [ ]  Section                                            (1 Point)  [ ] Congestive heart failure (in the previous month)  (1 Point)                 [ ] Minor surgery                                                 (1 Point)   [ ] Inflammatory bowel disease                             (1 Point)                 [ ] Arthroscopic surgery                                        (2 Points)  [ ] Central venous access                                    (2 Points)                [ ] General surgery lasting more than 45 minutes   (2 Points)       [ ] Stroke (in the previous month)                          (5 Points)               [ x] Elective arthroplasty                                        (5 Points)                                                                                                                                               HEMATOLOGY RELATED FACTORS                                                 TRAUMA RELATED RISK FACTORS  [ ] Prior episodes of VTE                                     (3 Points)                 [ ] Fracture of the hip, pelvis, or leg                       (5 Points)  [ ] Positive family history for VTE                         (3 Points)                 [ ] Acute spinal cord injury (in the previous month)  (5 Points)  [ ] Prothrombin 28962 A                                      (3 Points)                 [ ] Paralysis  (less than 1 month)                          (5 Points)  [ ] Factor V Leiden                                             (3 Points)                 [ ] Multiple Trauma within 1 month                         (5 Points)  [ ] Lupus anticoagulants                                     (3 Points)                                                           [ ] Anticardiolipin antibodies                                (3 Points)                                                       [ ] High homocysteine in the blood                      (3 Points)                                             [ ] Other congenital or acquired thrombophilia       (3 Points)                                                [ ] Heparin induced thrombocytopenia                  (3 Points)                                          Total Score [      7    ]

## 2021-02-26 NOTE — H&P PST ADULT - NSICDXPROBLEM_GEN_ALL_CORE_FT
PROBLEM DIAGNOSES  Problem: Unilateral primary osteoarthritis, right knee  Assessment and Plan: Right total knee arhtroplasty robotic assisted  labs - cbc,pt/ptt,bmp,t&s,nose cx,ekg  M/C required  preop 3 day hibiclens instruction reviewed and given .instructed on if  nose cx positive use mupuricin 5 days and checklist given  take routine meds DOS with sips of water. avoid NSAID and OTC supplements. verbalized understanding  information on proper nutrition , increase protein and better food choices provided in packet   Ensure clear given    Problem: HTN (hypertension)  Assessment and Plan: Continue current regimen and medications.     Problem: Melanoma  Assessment and Plan: treated with surgery

## 2021-02-26 NOTE — OCCUPATIONAL THERAPY INITIAL EVALUATION ADULT - UPPER BODY DRESSING, PREVIOUS LEVEL OF FUNCTION, OT EVAL
Seizure precautions d/w pt and her mother  Advised if any seizure or syncope call 911  They agreed and voiced understanding   independent

## 2021-02-26 NOTE — PHYSICAL THERAPY INITIAL EVALUATION ADULT - PERTINENT HX OF CURRENT PROBLEM, REHAB EVAL
Patient attends pre-op testing today following consult c Dr. Bonilla due to chronic pain to R knee. Elective R TKA is now scheduled in this facility for 3/5/2021.

## 2021-02-26 NOTE — OCCUPATIONAL THERAPY INITIAL EVALUATION ADULT - ADDITIONAL COMMENTS
Pt reports she lives with spouse (who can assist post op) in a private house with no steps to enter and 13 steps with right railing up to reach second floor. Pt reports she has access to walk in shower with retractable shower head & standard toilet (3:1 commode can sit over toilet). Pt states she is independent with ADLs and functional mobility with straight cane PRN. Pt states she has 6/10 pain at rest and 10/10 pain when its at its worst. Pt states pain increases randomly and takes Ibuprofen for pain management. Pt reports adverse reaction to oxycodone. Pt wears glasses for distance, is right hand dominant, drives and does not wear hearing aids. Pt currently works in real estate sales and has recent melanoma surgery.

## 2021-02-26 NOTE — H&P PST ADULT - HISTORY OF PRESENT ILLNESS
65F pmh htn, melanoma, increased ocular pressure c/o right knee pain 2/2 unilateral primary osteoarthritis here for PST for scheduled Right total knee arthroplasty robotic assisted  this patine denies any fever, cough, sob, flu like symptoms or travel outside of the [place] in the past 30 days

## 2021-02-26 NOTE — OCCUPATIONAL THERAPY INITIAL EVALUATION ADULT - STANDING BALANCE: STATIC
Discussed with Dr Dykes (ortho) and verified that patient is ok to go back to her room 358.   normal balance

## 2021-02-26 NOTE — PHYSICAL THERAPY INITIAL EVALUATION ADULT - ADDITIONAL COMMENTS
Pt lives with her  (whom can provide assist upon D/C home) in a private home, no entry steps, 1 flight of stairs inside c R rail up. Pt has a tub/shower combo with a retractable shower head, standard toilet seat height, & no grab bar. Pt states she is currently independent with all functional mobility including community ambulation with straight cane and knee brace for the past ten days. Pt states she is independent with ADL's as well. Pt reports daily 6/10 pain & states it is worse with any activity 10/10. Pt is right hand dominant, wears eye glasses, drives, & is currently working. Pt denies taking narcotics for pain management. Goal of therapy: manage pain & improve functional mobility.

## 2021-02-26 NOTE — OCCUPATIONAL THERAPY INITIAL EVALUATION ADULT - GENERAL OBSERVATIONS, REHAB EVAL
Chart reviewed. Patient encountered seated in chair in pre op waiting area with NAD. Patient underwent occupational therapy pre-operative consultation to determine current functional ADL limitations in order to recommend correct equipment for patient to perform functional ADLS post operatively.

## 2021-02-27 LAB
A1C WITH ESTIMATED AVERAGE GLUCOSE RESULT: 5.7 % — HIGH (ref 4–5.6)
ESTIMATED AVERAGE GLUCOSE: 117 MG/DL — HIGH (ref 68–114)
MRSA PCR RESULT.: SIGNIFICANT CHANGE UP
S AUREUS DNA NOSE QL NAA+PROBE: DETECTED

## 2021-03-02 ENCOUNTER — APPOINTMENT (OUTPATIENT)
Dept: DISASTER EMERGENCY | Facility: CLINIC | Age: 66
End: 2021-03-02

## 2021-03-02 ENCOUNTER — APPOINTMENT (OUTPATIENT)
Dept: FAMILY MEDICINE | Facility: CLINIC | Age: 66
End: 2021-03-02
Payer: COMMERCIAL

## 2021-03-02 VITALS
TEMPERATURE: 98.4 F | SYSTOLIC BLOOD PRESSURE: 119 MMHG | OXYGEN SATURATION: 98 % | HEIGHT: 66 IN | WEIGHT: 290 LBS | BODY MASS INDEX: 46.61 KG/M2 | DIASTOLIC BLOOD PRESSURE: 68 MMHG | HEART RATE: 81 BPM

## 2021-03-02 DIAGNOSIS — E87.6 HYPOKALEMIA: ICD-10-CM

## 2021-03-02 PROCEDURE — 99072 ADDL SUPL MATRL&STAF TM PHE: CPT

## 2021-03-02 PROCEDURE — 99213 OFFICE O/P EST LOW 20 MIN: CPT | Mod: 25

## 2021-03-02 PROCEDURE — 36415 COLL VENOUS BLD VENIPUNCTURE: CPT

## 2021-03-03 ENCOUNTER — NON-APPOINTMENT (OUTPATIENT)
Age: 66
End: 2021-03-03

## 2021-03-03 LAB
POTASSIUM SERPL-SCNC: 4 MMOL/L
SARS-COV-2 N GENE NPH QL NAA+PROBE: NOT DETECTED

## 2021-03-04 ENCOUNTER — TRANSCRIPTION ENCOUNTER (OUTPATIENT)
Age: 66
End: 2021-03-04

## 2021-03-05 ENCOUNTER — INPATIENT (INPATIENT)
Facility: HOSPITAL | Age: 66
LOS: 0 days | Discharge: HOME HEALTH | End: 2021-03-06
Attending: ORTHOPAEDIC SURGERY | Admitting: ORTHOPAEDIC SURGERY
Payer: COMMERCIAL

## 2021-03-05 ENCOUNTER — APPOINTMENT (OUTPATIENT)
Dept: ORTHOPEDIC SURGERY | Facility: HOSPITAL | Age: 66
End: 2021-03-05

## 2021-03-05 ENCOUNTER — RESULT REVIEW (OUTPATIENT)
Age: 66
End: 2021-03-05

## 2021-03-05 ENCOUNTER — TRANSCRIPTION ENCOUNTER (OUTPATIENT)
Age: 66
End: 2021-03-05

## 2021-03-05 VITALS
DIASTOLIC BLOOD PRESSURE: 72 MMHG | HEART RATE: 132 BPM | RESPIRATION RATE: 72 BRPM | TEMPERATURE: 98 F | WEIGHT: 192.68 LBS | OXYGEN SATURATION: 98 % | SYSTOLIC BLOOD PRESSURE: 132 MMHG | HEIGHT: 67 IN

## 2021-03-05 DIAGNOSIS — Z98.891 HISTORY OF UTERINE SCAR FROM PREVIOUS SURGERY: Chronic | ICD-10-CM

## 2021-03-05 DIAGNOSIS — Z98.890 OTHER SPECIFIED POSTPROCEDURAL STATES: Chronic | ICD-10-CM

## 2021-03-05 DIAGNOSIS — Z98.1 ARTHRODESIS STATUS: Chronic | ICD-10-CM

## 2021-03-05 PROCEDURE — 88311 DECALCIFY TISSUE: CPT | Mod: 26

## 2021-03-05 PROCEDURE — 20985 CPTR-ASST DIR MS PX: CPT

## 2021-03-05 PROCEDURE — 27447 TOTAL KNEE ARTHROPLASTY: CPT | Mod: 22,RT

## 2021-03-05 PROCEDURE — 88305 TISSUE EXAM BY PATHOLOGIST: CPT | Mod: 26

## 2021-03-05 PROCEDURE — 73560 X-RAY EXAM OF KNEE 1 OR 2: CPT | Mod: 26,RT

## 2021-03-05 RX ORDER — LOSARTAN POTASSIUM 100 MG/1
100 TABLET, FILM COATED ORAL DAILY
Refills: 0 | Status: DISCONTINUED | OUTPATIENT
Start: 2021-03-05 | End: 2021-03-06

## 2021-03-05 RX ORDER — ACETAMINOPHEN 500 MG
3 TABLET ORAL
Qty: 60 | Refills: 0
Start: 2021-03-05

## 2021-03-05 RX ORDER — ONDANSETRON 8 MG/1
4 TABLET, FILM COATED ORAL ONCE
Refills: 0 | Status: DISCONTINUED | OUTPATIENT
Start: 2021-03-05 | End: 2021-03-05

## 2021-03-05 RX ORDER — SODIUM CHLORIDE 9 MG/ML
500 INJECTION, SOLUTION INTRAVENOUS ONCE
Refills: 0 | Status: COMPLETED | OUTPATIENT
Start: 2021-03-06 | End: 2021-03-06

## 2021-03-05 RX ORDER — TRAMADOL HYDROCHLORIDE 50 MG/1
50 TABLET ORAL EVERY 6 HOURS
Refills: 0 | Status: DISCONTINUED | OUTPATIENT
Start: 2021-03-05 | End: 2021-03-06

## 2021-03-05 RX ORDER — HYDROMORPHONE HYDROCHLORIDE 2 MG/ML
0.5 INJECTION INTRAMUSCULAR; INTRAVENOUS; SUBCUTANEOUS
Refills: 0 | Status: DISCONTINUED | OUTPATIENT
Start: 2021-03-05 | End: 2021-03-05

## 2021-03-05 RX ORDER — ASPIRIN/CALCIUM CARB/MAGNESIUM 324 MG
1 TABLET ORAL
Qty: 60 | Refills: 0
Start: 2021-03-05 | End: 2021-04-03

## 2021-03-05 RX ORDER — POLYETHYLENE GLYCOL 3350 17 G/17G
17 POWDER, FOR SOLUTION ORAL AT BEDTIME
Refills: 0 | Status: DISCONTINUED | OUTPATIENT
Start: 2021-03-05 | End: 2021-03-06

## 2021-03-05 RX ORDER — MAGNESIUM HYDROXIDE 400 MG/1
30 TABLET, CHEWABLE ORAL DAILY
Refills: 0 | Status: DISCONTINUED | OUTPATIENT
Start: 2021-03-05 | End: 2021-03-06

## 2021-03-05 RX ORDER — SODIUM CHLORIDE 9 MG/ML
1000 INJECTION, SOLUTION INTRAVENOUS
Refills: 0 | Status: DISCONTINUED | OUTPATIENT
Start: 2021-03-05 | End: 2021-03-05

## 2021-03-05 RX ORDER — LOSARTAN POTASSIUM 100 MG/1
1 TABLET, FILM COATED ORAL
Qty: 0 | Refills: 0 | DISCHARGE

## 2021-03-05 RX ORDER — HYDROCHLOROTHIAZIDE 25 MG
12.5 TABLET ORAL DAILY
Refills: 0 | Status: DISCONTINUED | OUTPATIENT
Start: 2021-03-05 | End: 2021-03-06

## 2021-03-05 RX ORDER — SODIUM CHLORIDE 9 MG/ML
500 INJECTION, SOLUTION INTRAVENOUS ONCE
Refills: 0 | Status: COMPLETED | OUTPATIENT
Start: 2021-03-05 | End: 2021-03-05

## 2021-03-05 RX ORDER — CALCIUM CARBONATE 500(1250)
0 TABLET ORAL
Qty: 0 | Refills: 0 | DISCHARGE

## 2021-03-05 RX ORDER — ASPIRIN/CALCIUM CARB/MAGNESIUM 324 MG
81 TABLET ORAL
Refills: 0 | Status: DISCONTINUED | OUTPATIENT
Start: 2021-03-05 | End: 2021-03-06

## 2021-03-05 RX ORDER — OXYCODONE HYDROCHLORIDE 5 MG/1
5 TABLET ORAL EVERY 4 HOURS
Refills: 0 | Status: DISCONTINUED | OUTPATIENT
Start: 2021-03-05 | End: 2021-03-06

## 2021-03-05 RX ORDER — TRAMADOL HYDROCHLORIDE 50 MG/1
1 TABLET ORAL
Qty: 20 | Refills: 0
Start: 2021-03-05

## 2021-03-05 RX ORDER — LATANOPROST 0.05 MG/ML
1 SOLUTION/ DROPS OPHTHALMIC; TOPICAL
Qty: 0 | Refills: 0 | DISCHARGE

## 2021-03-05 RX ORDER — ZINC SULFATE TAB 220 MG (50 MG ZINC EQUIVALENT) 220 (50 ZN) MG
0 TAB ORAL
Qty: 0 | Refills: 0 | DISCHARGE

## 2021-03-05 RX ORDER — CEFAZOLIN SODIUM 1 G
2000 VIAL (EA) INJECTION EVERY 8 HOURS
Refills: 0 | Status: COMPLETED | OUTPATIENT
Start: 2021-03-05 | End: 2021-03-06

## 2021-03-05 RX ORDER — LATANOPROST 0.05 MG/ML
1 SOLUTION/ DROPS OPHTHALMIC; TOPICAL AT BEDTIME
Refills: 0 | Status: DISCONTINUED | OUTPATIENT
Start: 2021-03-05 | End: 2021-03-06

## 2021-03-05 RX ORDER — HYDROMORPHONE HYDROCHLORIDE 2 MG/ML
0.25 INJECTION INTRAMUSCULAR; INTRAVENOUS; SUBCUTANEOUS
Refills: 0 | Status: DISCONTINUED | OUTPATIENT
Start: 2021-03-05 | End: 2021-03-05

## 2021-03-05 RX ORDER — ACETAMINOPHEN 500 MG
1000 TABLET ORAL ONCE
Refills: 0 | Status: DISCONTINUED | OUTPATIENT
Start: 2021-03-05 | End: 2021-03-05

## 2021-03-05 RX ORDER — ACETAMINOPHEN 500 MG
1000 TABLET ORAL ONCE
Refills: 0 | Status: DISCONTINUED | OUTPATIENT
Start: 2021-03-05 | End: 2021-03-06

## 2021-03-05 RX ORDER — ONDANSETRON 8 MG/1
4 TABLET, FILM COATED ORAL EVERY 6 HOURS
Refills: 0 | Status: DISCONTINUED | OUTPATIENT
Start: 2021-03-05 | End: 2021-03-06

## 2021-03-05 RX ORDER — ACETAMINOPHEN 500 MG
975 TABLET ORAL EVERY 8 HOURS
Refills: 0 | Status: DISCONTINUED | OUTPATIENT
Start: 2021-03-06 | End: 2021-03-06

## 2021-03-05 RX ORDER — SENNA PLUS 8.6 MG/1
2 TABLET ORAL AT BEDTIME
Refills: 0 | Status: DISCONTINUED | OUTPATIENT
Start: 2021-03-05 | End: 2021-03-06

## 2021-03-05 RX ORDER — OXYCODONE HYDROCHLORIDE 5 MG/1
1 TABLET ORAL
Qty: 42 | Refills: 0
Start: 2021-03-05

## 2021-03-05 RX ORDER — ACETAMINOPHEN 500 MG
1000 TABLET ORAL ONCE
Refills: 0 | Status: COMPLETED | OUTPATIENT
Start: 2021-03-05 | End: 2021-03-05

## 2021-03-05 RX ORDER — OXYCODONE HYDROCHLORIDE 5 MG/1
10 TABLET ORAL EVERY 4 HOURS
Refills: 0 | Status: DISCONTINUED | OUTPATIENT
Start: 2021-03-05 | End: 2021-03-06

## 2021-03-05 RX ORDER — ACETAMINOPHEN 500 MG
650 TABLET ORAL ONCE
Refills: 0 | Status: COMPLETED | OUTPATIENT
Start: 2021-03-05 | End: 2021-03-05

## 2021-03-05 RX ORDER — KETOROLAC TROMETHAMINE 30 MG/ML
15 SYRINGE (ML) INJECTION EVERY 6 HOURS
Refills: 0 | Status: DISCONTINUED | OUTPATIENT
Start: 2021-03-05 | End: 2021-03-06

## 2021-03-05 RX ORDER — CELECOXIB 200 MG/1
200 CAPSULE ORAL ONCE
Refills: 0 | Status: COMPLETED | OUTPATIENT
Start: 2021-03-05 | End: 2021-03-05

## 2021-03-05 RX ORDER — DEXAMETHASONE 0.5 MG/5ML
8 ELIXIR ORAL ONCE
Refills: 0 | Status: COMPLETED | OUTPATIENT
Start: 2021-03-06 | End: 2021-03-06

## 2021-03-05 RX ORDER — CHOLECALCIFEROL (VITAMIN D3) 125 MCG
0 CAPSULE ORAL
Qty: 0 | Refills: 0 | DISCHARGE

## 2021-03-05 RX ORDER — PANTOPRAZOLE SODIUM 20 MG/1
40 TABLET, DELAYED RELEASE ORAL
Refills: 0 | Status: DISCONTINUED | OUTPATIENT
Start: 2021-03-05 | End: 2021-03-06

## 2021-03-05 RX ORDER — CELECOXIB 200 MG/1
200 CAPSULE ORAL EVERY 12 HOURS
Refills: 0 | Status: DISCONTINUED | OUTPATIENT
Start: 2021-03-06 | End: 2021-03-06

## 2021-03-05 RX ORDER — OMEPRAZOLE 10 MG/1
1 CAPSULE, DELAYED RELEASE ORAL
Qty: 30 | Refills: 0
Start: 2021-03-05 | End: 2021-04-03

## 2021-03-05 RX ADMIN — SODIUM CHLORIDE 3 MILLILITER(S): 9 INJECTION INTRAMUSCULAR; INTRAVENOUS; SUBCUTANEOUS at 22:00

## 2021-03-05 RX ADMIN — POLYETHYLENE GLYCOL 3350 17 GRAM(S): 17 POWDER, FOR SOLUTION ORAL at 22:38

## 2021-03-05 RX ADMIN — ONDANSETRON 4 MILLIGRAM(S): 8 TABLET, FILM COATED ORAL at 22:48

## 2021-03-05 RX ADMIN — SODIUM CHLORIDE 3 MILLILITER(S): 9 INJECTION INTRAMUSCULAR; INTRAVENOUS; SUBCUTANEOUS at 14:25

## 2021-03-05 RX ADMIN — Medication 15 MILLIGRAM(S): at 15:08

## 2021-03-05 RX ADMIN — Medication 400 MILLIGRAM(S): at 17:34

## 2021-03-05 RX ADMIN — Medication 1000 MILLIGRAM(S): at 18:00

## 2021-03-05 RX ADMIN — Medication 100 MILLIGRAM(S): at 19:30

## 2021-03-05 RX ADMIN — ONDANSETRON 4 MILLIGRAM(S): 8 TABLET, FILM COATED ORAL at 15:08

## 2021-03-05 RX ADMIN — Medication 81 MILLIGRAM(S): at 17:46

## 2021-03-05 RX ADMIN — SODIUM CHLORIDE 1000 MILLILITER(S): 9 INJECTION, SOLUTION INTRAVENOUS at 14:27

## 2021-03-05 RX ADMIN — Medication 15 MILLIGRAM(S): at 22:55

## 2021-03-05 RX ADMIN — OXYCODONE HYDROCHLORIDE 5 MILLIGRAM(S): 5 TABLET ORAL at 19:33

## 2021-03-05 RX ADMIN — OXYCODONE HYDROCHLORIDE 10 MILLIGRAM(S): 5 TABLET ORAL at 15:23

## 2021-03-05 RX ADMIN — OXYCODONE HYDROCHLORIDE 10 MILLIGRAM(S): 5 TABLET ORAL at 14:27

## 2021-03-05 RX ADMIN — Medication 650 MILLIGRAM(S): at 09:58

## 2021-03-05 RX ADMIN — CELECOXIB 200 MILLIGRAM(S): 200 CAPSULE ORAL at 09:58

## 2021-03-05 RX ADMIN — Medication 15 MILLIGRAM(S): at 15:23

## 2021-03-05 RX ADMIN — Medication 15 MILLIGRAM(S): at 22:38

## 2021-03-05 RX ADMIN — SODIUM CHLORIDE 1000 MILLILITER(S): 9 INJECTION, SOLUTION INTRAVENOUS at 12:38

## 2021-03-05 RX ADMIN — OXYCODONE HYDROCHLORIDE 5 MILLIGRAM(S): 5 TABLET ORAL at 20:19

## 2021-03-05 RX ADMIN — SENNA PLUS 2 TABLET(S): 8.6 TABLET ORAL at 22:38

## 2021-03-05 NOTE — DISCHARGE NOTE PROVIDER - CARE PROVIDER_API CALL
Blaise Bonilla)  Orthopaedic Surgery  [address], [address]  [place] [address]  Phone: [phone]  Fax: [phone]  Follow Up Time:

## 2021-03-05 NOTE — DISCHARGE NOTE PROVIDER - NSDCFUSCHEDAPPT_GEN_ALL_CORE_FT
SOHAM MCNEAL ; 03/23/2021 ; NPP OrthoSurg [address] [facility]  SOHAM MCNEAL ; 05/26/2021 ; [facility] [address] [facility]  SOHAM MCNEAL ; 05/26/2021 ; NPP Derm [address]

## 2021-03-05 NOTE — DISCHARGE NOTE NURSING/CASE MANAGEMENT/SOCIAL WORK - NSSCTYPOFSERV_GEN_ALL_CORE
[facility] /  Home  Care  Services [facility] /  Home  Care  Services/SOC 3/7

## 2021-03-05 NOTE — DISCHARGE NOTE NURSING/CASE MANAGEMENT/SOCIAL WORK - PATIENT PORTAL LINK FT
You can access the FollowMyHealth Patient Portal offered by [facility] by registering at the following website: [email or web address]. By joining [employer]’s FollowMyHealth portal, you will also be able to view your health information using other applications (apps) compatible with our system.

## 2021-03-05 NOTE — PHYSICAL THERAPY INITIAL EVALUATION ADULT - PLANNED THERAPY INTERVENTIONS, PT EVAL
balance training/bed mobility training/gait training/strengthening/transfer training Pt will; be able to negotiate 1 flight of steps using right rail up & down facing the riails sideways, maintaining WB precaution in RLE in 2 to 3 days/balance training/bed mobility training/gait training/strengthening/transfer training

## 2021-03-05 NOTE — DISCHARGE NOTE PROVIDER - NSDCMRMEDTOKEN_GEN_ALL_CORE_FT
calcium carbonate:   glucosamine 500 mg oral capsule: 1 cap(s) orally once a day  hydroCHLOROthiazide 12.5 mg oral tablet: 1 tab(s) orally once a day  latanoprost 0.005% ophthalmic solution: 1 drop(s) to each affected eye once a day (in the evening)  losartan 100 mg oral tablet: 1 tab(s) orally once a day  mupirocin 2% topical ointment: Apply topically to affected area 2 times a day intranasaly  Vitamin D3 10,000 intl units (250 mcg) oral capsule:   Zinc: orally once a day   acetaminophen 325 mg oral capsule: 3 cap(s) orally every 8 hours, As Needed   Aspirin Enteric Coated 81 mg oral delayed release tablet: 1 tab(s) orally 2 times a day   calcium carbonate:   glucosamine 500 mg oral capsule: 1 cap(s) orally once a day  hydroCHLOROthiazide 12.5 mg oral tablet: 1 tab(s) orally once a day  latanoprost 0.005% ophthalmic solution: 1 drop(s) to each affected eye once a day (in the evening)  losartan 100 mg oral tablet: 1 tab(s) orally once a day  naproxen 500 mg oral tablet: 1 tab(s) orally 2 times a day   omeprazole 40 mg oral delayed release capsule: 1 cap(s) orally once a day   oxyCODONE 5 mg oral tablet: 1 tab(s) orally every 4 hours, As Needed MDD:6 tabs  traMADol 50 mg oral tablet: 1 tab(s) orally every 6 hours, As Needed MDD:4 tablets  Vitamin D3 10,000 intl units (250 mcg) oral capsule:   Zinc: orally once a day

## 2021-03-05 NOTE — PHYSICAL THERAPY INITIAL EVALUATION ADULT - GENERAL OBSERVATIONS, REHAB EVAL
Patient found semi-fowlers in bed with (+) PIV lock, bilateral SCDs, clean dry intact dressing to right knee in place

## 2021-03-05 NOTE — PHYSICAL THERAPY INITIAL EVALUATION ADULT - STRENGTHENING, PT EVAL
Patient will increase strength in bilateral lower limbs to 4/5 in order to have good limb control and balance with transfers and gait across all surfaces without fatigue or fall risk in 2 weeks. Patient will increase strength in bilateral lower limbs to WFL  in order to have good limb control and balance with transfers and gait across all surfaces without fatigue or fall risk in 2 weeks.

## 2021-03-05 NOTE — PHYSICAL THERAPY INITIAL EVALUATION ADULT - ADDITIONAL COMMENTS
As per pre-op 2/26/2021 and reviewed with patient today:   Pt lives with her  (whom can provide assist upon D/C home) in a private home, no entry steps, 1 flight of stairs inside c R rail up. Pt has a tub/shower combo with a retractable shower head, standard toilet seat height, & no grab bar. Pt states she is currently independent with all functional mobility including community ambulation with straight cane and knee brace for the past ten days. Pt states she is independent with ADL's as well. Pt reports daily 6/10 pain & states it is worse with any activity 10/10. Pt is right hand dominant, wears eye glasses, drives, & is currently working. Pt denies taking narcotics for pain management. Goal of therapy: manage pain & improve functional mobility.

## 2021-03-05 NOTE — PHYSICAL THERAPY INITIAL EVALUATION ADULT - PHYSICAL ASSIST/NONPHYSICAL ASSIST: SUPINE/SIT, REHAB EVAL
Procedures   Foot Exam     Patient is doing much better  Patient is walking in [place]   This is on the left foot  She is doing well  She is very happy with bilateral surgical result  She is treating left foot wound as directed  She has no history of fever or night sweats  0   Neurovascular status intact  No change  Right foot is within normal limits  No pain with palpation heel  Left foot demonstrates small dehiscence at the distal aspect of the incision  No cellulitis or abscess noted  No foreign body noted  X-ray  Both sides appear to be within normal limits  Plan  X-rays taken  Left foot wound debrided  Silver nitrate applied  Patient will bandage twice daily    She will remain in Airc[employer]   She will return for follow-up supervision/verbal cues

## 2021-03-05 NOTE — PHYSICAL THERAPY INITIAL EVALUATION ADULT - PERTINENT HX OF CURRENT PROBLEM, REHAB EVAL
Pt with history of right knee pain, CT knee 2/26/2021 showing moderate/severe tricompartmental OA of right knee. Patient s/p right TKA and is now POD#0.

## 2021-03-05 NOTE — PHYSICAL THERAPY INITIAL EVALUATION ADULT - DISCHARGE DISPOSITION, PT EVAL
Home with home PT. Patient has cane, received rolling walker and 3:1 commode at home prior to surgery. Home with home PT. Patient has cane, received rolling walker and 3:1 commode at home prior to surgery./home w/ home PT

## 2021-03-05 NOTE — DISCHARGE NOTE PROVIDER - HOSPITAL COURSE
The patient is a 65year old Female status post elective Total Knee Arthroplasty to the right knee after failing outpatient non-operative conservative management. Patient presented to [facility] after being medically cleared for an elective surgical procedure. The patient was taken to the operating room on date mentioned above. Prophylactic antibiotics were started before the procedure and continued for 24 hours.  There were no complications during the procedure and patient tolerated the procedure well. The patient was transferred to recovery room in stable condition and subsequently to surgical floor. Patient was placed on Aspirin for anticoagulation. All home medications were continued. The patient received physical therapy daily and daily labs were followed.  The dressing was kept clean, dry, intact.  The rest of the hospital stay was unremarkable. The patient is now stable for discharge.

## 2021-03-05 NOTE — PHYSICAL THERAPY INITIAL EVALUATION ADULT - BALANCE TRAINING, PT EVAL
Patient will demonstrate improved balance with appropriate devices in order to be independent with ADLS and mobility with decreased falls risk in 2 weeks.

## 2021-03-05 NOTE — PHYSICAL THERAPY INITIAL EVALUATION ADULT - GAIT TRAINING, PT EVAL
Pt will demonstrate ability to ambulate >/= 200 feet on level surfaces indoor with appropriate devices in 2 weeks. Pt will demonstrate ability to ambulate >/= 500 feet on level surfaces  with appropriate devices independently  in 2 weeks.

## 2021-03-05 NOTE — PROGRESS NOTE ADULT - ATTENDING COMMENTS
I agree with the above note on this patient. All pertinent films have been reviewed. Please refer to clinical documentation of the history, physical examinations, data summary, and both assessment and plan as documented above and with which I agree.    Blaise Bonilla MD  Attending Orthopedic Surgeon

## 2021-03-05 NOTE — DISCHARGE NOTE PROVIDER - NSDCFUADDAPPT_GEN_ALL_CORE_FT
1.	Pain Control  2.	Walking with full weight bearing as tolerated, with assistive devices (walker/Cane as Needed)  3.	DVT Prophylaxis for 30 days  4.	PT as needed  5.	Follow up with Dr. Bonilla as Outpatient in 10-14 Days after Discharge from the Hospital or Rehab. Call Office For Appointment.  6.	Keep Dressing  Clean and dry.  7.	Ice affected area as Needed

## 2021-03-05 NOTE — OCCUPATIONAL THERAPY INITIAL EVALUATION ADULT - ADDITIONAL COMMENTS
Pre op assessment - Pt reports she lives with spouse (who can assist post op) in a private house with no steps to enter and 13 steps with right railing up to reach second floor. Pt reports she has access to walk in shower with retractable shower head & standard toilet (3:1 commode can sit over toilet).

## 2021-03-05 NOTE — PHYSICAL THERAPY INITIAL EVALUATION ADULT - TRANSFER TRAINING, PT EVAL
Pt will demonstrate ability to transfer independently across all surfaces with appropriate device in 2 weeks.

## 2021-03-06 VITALS
SYSTOLIC BLOOD PRESSURE: 149 MMHG | TEMPERATURE: 98 F | HEART RATE: 62 BPM | RESPIRATION RATE: 18 BRPM | OXYGEN SATURATION: 98 % | DIASTOLIC BLOOD PRESSURE: 72 MMHG

## 2021-03-06 LAB
ANION GAP SERPL CALC-SCNC: 7 MMOL/L — SIGNIFICANT CHANGE UP (ref 5–17)
BUN SERPL-MCNC: 15 MG/DL — SIGNIFICANT CHANGE UP (ref 7–23)
CALCIUM SERPL-MCNC: 8.7 MG/DL — SIGNIFICANT CHANGE UP (ref 8.5–10.1)
CHLORIDE SERPL-SCNC: 104 MMOL/L — SIGNIFICANT CHANGE UP (ref 96–108)
CO2 SERPL-SCNC: 27 MMOL/L — SIGNIFICANT CHANGE UP (ref 22–31)
CREAT SERPL-MCNC: 0.75 MG/DL — SIGNIFICANT CHANGE UP (ref 0.5–1.3)
GLUCOSE SERPL-MCNC: 144 MG/DL — HIGH (ref 70–99)
HCT VFR BLD CALC: 36.1 % — SIGNIFICANT CHANGE UP (ref 34.5–45)
HGB BLD-MCNC: 12.2 G/DL — SIGNIFICANT CHANGE UP (ref 11.5–15.5)
MCHC RBC-ENTMCNC: 29.7 PG — SIGNIFICANT CHANGE UP (ref 27–34)
MCHC RBC-ENTMCNC: 33.8 GM/DL — SIGNIFICANT CHANGE UP (ref 32–36)
MCV RBC AUTO: 87.8 FL — SIGNIFICANT CHANGE UP (ref 80–100)
NRBC # BLD: 0 /100 WBCS — SIGNIFICANT CHANGE UP (ref 0–0)
PLATELET # BLD AUTO: 296 K/UL — SIGNIFICANT CHANGE UP (ref 150–400)
POTASSIUM SERPL-MCNC: 3.8 MMOL/L — SIGNIFICANT CHANGE UP (ref 3.5–5.3)
POTASSIUM SERPL-SCNC: 3.8 MMOL/L — SIGNIFICANT CHANGE UP (ref 3.5–5.3)
RBC # BLD: 4.11 M/UL — SIGNIFICANT CHANGE UP (ref 3.8–5.2)
RBC # FLD: 11.8 % — SIGNIFICANT CHANGE UP (ref 10.3–14.5)
SODIUM SERPL-SCNC: 138 MMOL/L — SIGNIFICANT CHANGE UP (ref 135–145)
WBC # BLD: 15.44 K/UL — HIGH (ref 3.8–10.5)
WBC # FLD AUTO: 15.44 K/UL — HIGH (ref 3.8–10.5)

## 2021-03-06 RX ORDER — ACETAMINOPHEN 500 MG
1000 TABLET ORAL ONCE
Refills: 0 | Status: COMPLETED | OUTPATIENT
Start: 2021-03-06 | End: 2021-03-06

## 2021-03-06 RX ADMIN — Medication 400 MILLIGRAM(S): at 02:30

## 2021-03-06 RX ADMIN — CELECOXIB 200 MILLIGRAM(S): 200 CAPSULE ORAL at 06:28

## 2021-03-06 RX ADMIN — Medication 101.6 MILLIGRAM(S): at 05:39

## 2021-03-06 RX ADMIN — SODIUM CHLORIDE 3 MILLILITER(S): 9 INJECTION INTRAMUSCULAR; INTRAVENOUS; SUBCUTANEOUS at 05:45

## 2021-03-06 RX ADMIN — Medication 12.5 MILLIGRAM(S): at 05:38

## 2021-03-06 RX ADMIN — SODIUM CHLORIDE 3 MILLILITER(S): 9 INJECTION INTRAMUSCULAR; INTRAVENOUS; SUBCUTANEOUS at 12:34

## 2021-03-06 RX ADMIN — SODIUM CHLORIDE 1000 MILLILITER(S): 9 INJECTION, SOLUTION INTRAVENOUS at 05:39

## 2021-03-06 RX ADMIN — Medication 15 MILLIGRAM(S): at 06:00

## 2021-03-06 RX ADMIN — Medication 975 MILLIGRAM(S): at 09:36

## 2021-03-06 RX ADMIN — Medication 975 MILLIGRAM(S): at 10:40

## 2021-03-06 RX ADMIN — Medication 75 MILLIGRAM(S): at 05:39

## 2021-03-06 RX ADMIN — CELECOXIB 200 MILLIGRAM(S): 200 CAPSULE ORAL at 05:36

## 2021-03-06 RX ADMIN — LOSARTAN POTASSIUM 100 MILLIGRAM(S): 100 TABLET, FILM COATED ORAL at 05:37

## 2021-03-06 RX ADMIN — Medication 15 MILLIGRAM(S): at 12:29

## 2021-03-06 RX ADMIN — Medication 100 MILLIGRAM(S): at 02:56

## 2021-03-06 RX ADMIN — Medication 1000 MILLIGRAM(S): at 03:00

## 2021-03-06 RX ADMIN — Medication 15 MILLIGRAM(S): at 13:30

## 2021-03-06 RX ADMIN — PANTOPRAZOLE SODIUM 40 MILLIGRAM(S): 20 TABLET, DELAYED RELEASE ORAL at 05:41

## 2021-03-06 RX ADMIN — Medication 15 MILLIGRAM(S): at 05:39

## 2021-03-06 RX ADMIN — Medication 81 MILLIGRAM(S): at 05:36

## 2021-03-06 NOTE — PROGRESS NOTE ADULT - ATTENDING COMMENTS
I agree with the above note and have personally seen and examined this patient. All pertinent films have been reviewed. Please refer to clinical documentation of the history, physical examinations, data summary, and both assessment and plan as documented above and with which I agree.    doing very well    Blaise Bonilla MD  Attending Orthopedic Surgeon

## 2021-03-06 NOTE — PROGRESS NOTE ADULT - SUBJECTIVE AND OBJECTIVE BOX
Orthopedics     Patient seen and examined at bedside, resting comfortably. No acute events overnight. Pain adequately controlled. Patient feeling well. Denies CP/SOB. No nausea or vomiting. No other acute complaints at this time. Pt reports ready for discharge home today.    Vital Signs Last 24 Hrs  T(C): 36.5 (03-06-21 @ 05:30), Max: 36.9 (03-05-21 @ 16:01)  T(F): 97.7 (03-06-21 @ 05:30), Max: 98.4 (03-05-21 @ 16:01)  HR: 68 (03-06-21 @ 05:30) (62 - 132)  BP: 159/80 (03-06-21 @ 05:30) (99/52 - 177/74)  BP(mean): --  RR: 17 (03-06-21 @ 05:30) (15 - 72)  SpO2: 95% (03-06-21 @ 05:30) (95% - 99%)                        12.2   15.44 )-----------( 296      ( 06 Mar 2021 06:31 )             36.1     06 Mar 2021 06:31    138    |  104    |  15     ----------------------------<  144    3.8     |  27     |  0.75     Ca    8.7        06 Mar 2021 06:31    Exam:  Gen: NAD, Awake and alert  RLE  Dressing clean and dry  +EHL/FHL/TA/GS  SILT L2-S1  +DP  Calf Soft NT  Compartments soft and compressible    
Orthopedics Post-op Check    POD 0 Total Knee Arthroplasty    Pt seen and examined at the bedside. Pain 5/10 at this time. Pt reports feeling well, has not worked with PT. Pt is aware of the plan and agrees, no other concerns at this time.     Vital Signs Last 24 Hrs  T(C): 36.6 (03-05-21 @ 13:52), Max: 36.8 (03-05-21 @ 09:41)  T(F): 97.8 (03-05-21 @ 13:52), Max: 98.3 (03-05-21 @ 09:41)  HR: 69 (03-05-21 @ 13:52) (69 - 132)  BP: 109/72 (03-05-21 @ 13:52) (99/52 - 132/72)  BP(mean): --  RR: 16 (03-05-21 @ 13:52) (15 - 72)  SpO2: 95% (03-05-21 @ 13:52) (95% - 99%)        Exam:  GEN: NAD, awake and alert, cooperative.   RLE  Dressing clean and dry  +EHL FHL TA GS  SILT L2-S1  +DP  Calf soft/nontender, compartments soft and compressible.     A/P:   65yFemale s/p R Total Knee Arthroplasty POD 0  -Advance diet as tolerated  -Pain control prn  -DVT/PE ppx  - WBAT/PT/OOB as tolerated   -Med Mgmt, continue home meds.  - FU am Labs  -PT eval pending  -Will reassess again in am.

## 2021-03-06 NOTE — PROGRESS NOTE ADULT - ASSESSMENT
A/P:  Patient is a 65y Female s/p R TKA Stable POD 1    -FU am labs  -Ice/Elevate  -Incentive Spirometry  -Multimodal Analgesia  -DVT PE ppx  -SCDs  -PT/OT OOB   -WBAT  -Ortho stable  -FU w/ Dr. Bonilla outpatient. Call office to schedule appointment.  -Discharge planning - Home today  -Will discuss w/ attending and advise if plan changes

## 2021-03-09 ENCOUNTER — NON-APPOINTMENT (OUTPATIENT)
Age: 66
End: 2021-03-09

## 2021-03-12 DIAGNOSIS — D03.9 MELANOMA IN SITU, UNSPECIFIED: ICD-10-CM

## 2021-03-12 DIAGNOSIS — I10 ESSENTIAL (PRIMARY) HYPERTENSION: ICD-10-CM

## 2021-03-12 DIAGNOSIS — E87.6 HYPOKALEMIA: ICD-10-CM

## 2021-03-12 DIAGNOSIS — M17.11 UNILATERAL PRIMARY OSTEOARTHRITIS, RIGHT KNEE: ICD-10-CM

## 2021-03-12 DIAGNOSIS — E66.9 OBESITY, UNSPECIFIED: ICD-10-CM

## 2021-03-12 DIAGNOSIS — Z88.5 ALLERGY STATUS TO NARCOTIC AGENT: ICD-10-CM

## 2021-03-12 DIAGNOSIS — R73.03 PREDIABETES: ICD-10-CM

## 2021-03-23 ENCOUNTER — APPOINTMENT (OUTPATIENT)
Dept: ORTHOPEDIC SURGERY | Facility: CLINIC | Age: 66
End: 2021-03-23
Payer: COMMERCIAL

## 2021-03-23 VITALS — WEIGHT: 180 LBS | BODY MASS INDEX: 29.05 KG/M2

## 2021-03-23 DIAGNOSIS — Z01.818 ENCOUNTER FOR OTHER PREPROCEDURAL EXAMINATION: ICD-10-CM

## 2021-03-23 DIAGNOSIS — M25.561 PAIN IN RIGHT KNEE: ICD-10-CM

## 2021-03-23 DIAGNOSIS — M17.11 UNILATERAL PRIMARY OSTEOARTHRITIS, RIGHT KNEE: ICD-10-CM

## 2021-03-23 PROCEDURE — 99024 POSTOP FOLLOW-UP VISIT: CPT

## 2021-03-23 PROCEDURE — 73564 X-RAY EXAM KNEE 4 OR MORE: CPT | Mod: RT

## 2021-03-23 NOTE — HISTORY OF PRESENT ILLNESS
[de-identified] : Status-post right total knee arthroplasty here for initial postoperative evaluation. Excellent progress is noted in terms of pain and restoration of function. Pain is well controlled with oral medications. There has been no change in medical health since discharge. The patient does require assistive devices.

## 2021-03-23 NOTE — PHYSICAL EXAM
[de-identified] : Patient is well nourished, well-developed, in no acute distress, with appropriate mood and affect. The patient is oriented to time, place, and person. Respirations are even and unlabored. Examination reveals satisfactory wound healing. No surrounding erythema. Motion is good and relatively pain free. Active knee flexion is greater than 90 degrees. [de-identified] : AP, lateral, sunrise knee x-rays of the right knee were ordered and obtained in the office and demonstrate satisfactory position and alignment of the components are present. No signs of loosening are seen.

## 2021-03-23 NOTE — DISCUSSION/SUMMARY
[de-identified] : The patient is doing well after right total knee arthroplasty. Written infectious precautions were reviewed. The patient will progress with physical therapy at this time and they will work on transitioning from requiring assistive devices for ambulation. Aspirin therapy will be continued for the full 4 week postoperative course for the purpose of orthopedic thromboembolism prophylaxis. Return around the 6 week anniversary from surgery for follow-up evaluation.

## 2021-04-20 ENCOUNTER — APPOINTMENT (OUTPATIENT)
Dept: ORTHOPEDIC SURGERY | Facility: CLINIC | Age: 66
End: 2021-04-20
Payer: COMMERCIAL

## 2021-04-20 DIAGNOSIS — M25.562 PAIN IN LEFT KNEE: ICD-10-CM

## 2021-04-20 PROCEDURE — 73564 X-RAY EXAM KNEE 4 OR MORE: CPT | Mod: LT

## 2021-04-20 PROCEDURE — 99213 OFFICE O/P EST LOW 20 MIN: CPT | Mod: 24

## 2021-04-20 PROCEDURE — 99072 ADDL SUPL MATRL&STAF TM PHE: CPT

## 2021-04-20 NOTE — HISTORY OF PRESENT ILLNESS
[de-identified] : This is a very pleasant 65-year-old female who is status-post right total knee arthroplasty here for routine postoperative evaluation. Excellent progress is noted in terms of pain and restoration of function. Pain is well controlled with oral medications. There has been no change in medical health since discharge. The patient does not require assistive devices. \par \par She is also here complaining of left knee pain.  She has noticed a bony prominence over the tibial tubercle.  This is not met her activities of daily living.  Is not limiting her walking tolerance.  She does not think it is growing but recently noticed and wanted me to examine her today.

## 2021-04-20 NOTE — PHYSICAL EXAM
[de-identified] : Patient is well nourished, well-developed, in no acute distress, with appropriate mood and affect. The patient is oriented to time, place, and person. Respirations are even and unlabored. Examination of the right knee reveals satisfactory wound healing. No surrounding erythema. Motion is good and relatively pain free. Active knee flexion is greater than 90 degrees.  Range of motion is 0-110.\par Left knee motion is painless and the knee moves from 0 to 135 degrees. The knee is stable within that range-of-motion to AP and ML stress with a 1A Lachman, negative anterior or posterior drawer and no instability to varus or valgus stress. The alignment of the knee is 5 degrees varus. No effusion or crepitus is noted.  Mild prominence of the tibial tubercle which appears bony.  No fluctuance.  Nonmobile.  No tenderness to palpation about the medial or lateral joint line, medial or lateral tibial plateau, medial or lateral femoral condyle, medial or lateral patellar facets, superior or inferior pole of the patella. Jr's is negative. Muscle strength is normal. Pedal pulses are palpable. Hip examination was negative. [de-identified] : Long standing knee, AP knee, lateral knee, and patellar views of the left knee were ordered and taken in the office and demonstrate no evidence of degenerative joint disease of the knee, fractures, intra-articular pathology.  There is chronic enthesopathy of the tibial tubercle at the insertion the patella tendon.\par \par AP, lateral, sunrise knee x-rays of the right knee were ordered and obtained in the office and demonstrate satisfactory position and alignment of the components are present. No signs of loosening are seen.

## 2021-04-20 NOTE — REASON FOR VISIT
TN called and spoke with [facility]. They stated they do not have tolvapten available, it is a specialty order. It would have to sent out to be filled.    TN also called [facility] pharmacy, and they stated it would have to be a send out as well.    TN looked on Tolvaptan website, and the [facility] in [place] and on [facility] have it in stock (they are the only ones in [place] listed that have it).   TN called the [facility] at [facility] and spoke with Lona. She stated they have the medication in stock. The doctor would have to fax the prescription and supporting clinicals for approval. The pharmacy is not open on Saturdays and Sundays. TN informed MD team, awaiting call back.     TN also spoke with Gloria at [facility]. She stated that medication is not covered by patient's insurance. It would have to be approved. She also informed TN that if patient absolutely needs to be on that medication MD team can send a request, but more than likely will not be covered. She informed TN that PHN would most likely want to see if patient can get another medication instead. TN will continue to follow.    TN informed Dr. Fernando regarding the above information. He will speak with nephrology and also call the [facility] at [facility].     --TN spoke with Gloria at [facility]. She informed TN it is absolutely not covered. If patient needs to be on the medication, will need to send a request, to see if approved.    Maia at [facility]  [address] [place] [address], [address] [address]  Phone: [phone]  Fax: [phone]  9:00 AM-6:00 PM M-F;  Closed Sat-Sun    Jennifer Guerrier RN  Transition Navigator  [phone]   [Follow-Up Visit] : a follow-up visit for [Artificial Knee Joint] : artificial knee joint [Knee Pain] : knee pain

## 2021-04-20 NOTE — DISCUSSION/SUMMARY
[de-identified] : The patient is doing well after right total knee arthroplasty.  Continue to weight-bear as tolerated.  Continue work with physical therapy and achieving more range of motion.  Follow-up is recommended in 6-month anniversary of the right total knee arthroplasty.\par The left knee bony prominence appears to be secondary to tibial tubercle prominence.  This was likely secondary to Osgood-Schlatter's as a child.  I told her that she should pay attention to see if it is growing in size over the next several months.  Otherwise monitoring.

## 2021-04-30 NOTE — ASU DISCHARGE PLAN (ADULT/PEDIATRIC) - DISCHARGE PLAN IS COMPLETE AND GIVEN TO PATIENT
Patient called again stating that the reason [facility] did not fill the prescription for Pyridium is because it is not on her formulary and would cost $140.00.  Patient called [facility] and was told that we can put through a PA to help defray the cost.  The [facility] brand AZO is 97.5 mg strength where the Pyridium is 200 mg strength.  Instructed patient to continue with the store bought brand until the PA goes through.  PA submitted through cover my meds.  Will monitor for response.   : Yes

## 2021-05-11 NOTE — PHYSICAL THERAPY INITIAL EVALUATION ADULT - CRITERIA FOR SKILLED THERAPEUTIC INTERVENTIONS
impairments found/functional limitations in following categories/risk reduction/prevention No anterior cervical lymphadenopathy

## 2021-05-24 ENCOUNTER — APPOINTMENT (OUTPATIENT)
Dept: DERMATOLOGY | Facility: CLINIC | Age: 66
End: 2021-05-24
Payer: COMMERCIAL

## 2021-05-24 VITALS — HEIGHT: 67 IN | BODY MASS INDEX: 29.03 KG/M2 | WEIGHT: 185 LBS

## 2021-05-24 PROCEDURE — 99213 OFFICE O/P EST LOW 20 MIN: CPT

## 2021-05-25 ENCOUNTER — APPOINTMENT (OUTPATIENT)
Dept: FAMILY MEDICINE | Facility: CLINIC | Age: 66
End: 2021-05-25
Payer: COMMERCIAL

## 2021-05-25 VITALS — TEMPERATURE: 98.2 F | SYSTOLIC BLOOD PRESSURE: 138 MMHG | DIASTOLIC BLOOD PRESSURE: 80 MMHG

## 2021-05-25 PROCEDURE — 99213 OFFICE O/P EST LOW 20 MIN: CPT

## 2021-05-26 ENCOUNTER — APPOINTMENT (OUTPATIENT)
Dept: FAMILY MEDICINE | Facility: CLINIC | Age: 66
End: 2021-05-26

## 2021-06-03 ENCOUNTER — FORM ENCOUNTER (OUTPATIENT)
Age: 66
End: 2021-06-03

## 2021-06-07 NOTE — OCCUPATIONAL THERAPY INITIAL EVALUATION ADULT - GROSSLY INTACT, SENSORY
**Discharge Summary





- Hospital Course


Free Text/Narrative:: 


Yocasta is an 89yoF who originally presented to [facility] for long-term stay in

the setting of FTT and need for assistance in daily activities.  She has 

remained relatively stable throughout her time here.  She continues to be fairly

independent at baseline but staff will do a stand-by assist with her using a 

walker with her instability.  We have been dealing with chronic stasis ulcers of

her lower extremities.  left has been healed for some time however the one on 

the right continues to bother; we had placed a referral to the wound clinic in 

[place] but she has not yet gone.  INR did fluctuate a bit this spring when 

she was on antibiotics for UTIs but leveled out after; she was initiated on 

Vitamin K as a stabilizer at that time and has done well with this.  Mood has 

been a bit down which is part of what is prompting the move to a SNF; med adju

stments have been well-tolerated and the patient is looking forward to more 

social interaction.





We will plan to discharge her to the SNF with her current medication list and 

therapy plans.





- Discharge Data


Discharge Date: 06/07/21


Discharge Disposition: DC/Tfer to SNF 03


Condition: Good





- Referral to Home Health


Primary Care Physician: 


Brian Hinson MD








- Discharge Diagnosis/Problem(s)


(1) Need for assistance with personal care


SNOMED Code(s): 96268986031588020


   ICD Code: Z74.1 - NEED FOR ASSISTANCE WITH PERSONAL CARE   Status: Acute   

Current Visit: No   





(2) Depression


SNOMED Code(s): 49042366


   ICD Code: F32.9 - MAJOR DEPRESSIVE DISORDER, SINGLE EPISODE, UNSPECIFIED   

Status: Chronic   Current Visit: No   





(3) Anxiety


SNOMED Code(s): 29964444


   ICD Code: F41.9 - ANXIETY DISORDER, UNSPECIFIED   Status: Chronic   Current 

Visit: No   





(4) CKD (chronic kidney disease)


SNOMED Code(s): 781988439


   ICD Code: N18.9 - CHRONIC KIDNEY DISEASE, UNSPECIFIED   Status: Chronic   

Current Visit: No   





(5) COPD (chronic obstructive pulmonary disease)


SNOMED Code(s): 42039755


   ICD Code: J44.9 - CHRONIC OBSTRUCTIVE PULMONARY DISEASE, UNSPECIFIED   

Status: Chronic   Current Visit: No   





(6) Diastolic HF (heart failure)


SNOMED Code(s): 785689006


   ICD Code: I50.30 - UNSPECIFIED DIASTOLIC (CONGESTIVE) HEART FAILURE   Status:

Chronic   Current Visit: No   





(7) GERD (gastroesophageal reflux disease)


SNOMED Code(s): 137133483


   ICD Code: K21.9 - GASTRO-ESOPHAGEAL REFLUX DISEASE WITHOUT ESOPHAGITIS   

Status: Chronic   Current Visit: No   





(8) Hypertension


SNOMED Code(s): 03520252


   ICD Code: I10 - ESSENTIAL (PRIMARY) HYPERTENSION   Status: Chronic   Current 

Visit: No   





- Patient Summary/Data


Consults: 


                                  Consultations





09/10/20 13:59


Consult to Case Management/ [CONS] Routine 





12/30/20 12:36


OT Evaluation and Treatment [CONS] Routine 





03/18/21 10:46


PT Evaluation and Treatment [CONS] Routine 





04/07/21 10:53


Consult to Speech Language Pathology [SLP Evaluation and Treatment] [CONS] 

Routine 














- Discharge Plan


*PRESCRIPTION DRUG MONITORING PROGRAM REVIEWED*: No


*COPY OF PRESCRIPTION DRUG MONITORING REPORT IN PATIENT FRANK: No


Prescriptions/Med Rec: 


Famotidine [Pepcid] 20 mg IV BEDTIME #30 sdv


Home Medications: 


                                    Home Meds





Nitroglycerin [Nitrostat] 0.4 mg SL ASDIRECTED PRN 05/19/15 [History]


Sertraline [Zoloft] 100 mg PO DAILY 05/19/15 [History]


rOPINIRole HCl [Requip] 0.5 mg PO BEDTIME 05/20/15 [History]


Calcium Carbonate [Tums Extra Strength] 750 mg PO TID PRN 10/20/16 [History]


Docusate Sodium [Colace] 100 mg PO DAILY PRN 04/27/18 [History]


polyethylene glycoL 3350 [MiraLAX] 17 gm PO DAILY PRN 04/27/18 [History]


Vitamin B Complex 1 tab PO DAILY 10/15/20 [History]


Acetaminophen [Tylenol Extra Strength] 1,000 mg PO BID PRN  tablet 06/07/21 [Rx]


Acetaminophen [Tylenol] 650 mg PO TID@0800,1300,2000  tablet 06/07/21 [Rx]


Cholecalciferol (Vitamin D3) [Vitamin D3] 50 mcg PO DAILY  tablet 06/07/21 [Rx]


Docusate Sodium/Sennosides [Senna Plus] 1 tab PO DAILY PRN  tablet 06/07/21 [Rx]


Famotidine [Pepcid] 20 mg IV BEDTIME #30 sdv 06/07/21 [Rx]


Ferrous Sulfate 325 mg PO Q48H  tablet 06/07/21 [Rx]


Furosemide [Lasix] 20 mg PO BID@0800,1300  tablet 06/07/21 [Rx]


Gabapentin [Neurontin] 100 mg PO BID@0800,1300  cap 06/07/21 [Rx]


Gabapentin [Neurontin] 300 mg PO BEDTIME  cap 06/07/21 [Rx]


Isosorbide Mononitrate [Imdur] 30 mg PO DAILY  tab.er 06/07/21 [Rx]


Loperamide [Imodium] 2 mg PO Q12H PRN  cap 06/07/21 [Rx]


Melatonin 6 mg PO BEDTIME  tablet 06/07/21 [Rx]


Metoprolol Succinate [Toprol XL] 25 mg PO DAILY  tab.er 06/07/21 [Rx]


Miconazole [Desenex 2%] 0 gm TOP BID PRN  cont 06/07/21 [Rx]


Mineral Oil/Petrolatum,White [Minerin] 0 gm TOP BID  jar 06/07/21 [Rx]


Omeprazole 20 mg PO DAILY@0700  cap.cr 06/07/21 [Rx]


Ondansetron [Zofran ODT] 4 mg PO Q4H PRN  tab.dis 06/07/21 [Rx]


Phytonadione [Vitamin K] 100 mcg PO DAILY  tablet 06/07/21 [Rx]


Probiotic Gummies 2 each PO DAILY@0700 06/07/21 [Rx]


Vitamin B Complex 1 each PO DAILY  tablet 06/07/21 [Rx]


Warfarin [Coumadin] 2.5 mg PO MoWeFr@2000  tablet 06/07/21 [Rx]


Warfarin [Coumadin] 5 mg PO SuTuThSa@2000  tablet 06/07/21 [Rx]


busPIRone [Buspar] 7.5 mg PO TID@0800,1300,2000  tablet 06/07/21 [Rx]


rOPINIRole [Requip] 0.5 mg PO BEDTIME  tablet 06/07/21 [Rx]








Patient Handouts:  How to Increase Your Level of Physical Activity





- Discharge Summary/Plan Comment


DC Time >30 min.: No





- Patient Data


Vitals - Most Recent: 


                                Last Vital Signs











Temp  98 F   06/07/21 06:34


 


Pulse  73   06/07/21 07:29


 


Resp  16   05/21/21 05:59


 


BP  127/75   06/07/21 07:29


 


Pulse Ox  94 L  06/04/21 08:00











Weight - Most Recent: 177 lb 0.499 oz


I&O - Last 24 hours: 


                                 Intake & Output











 06/06/21 06/07/21 06/07/21





 22:59 06:59 14:59


 


Intake Total 180  180


 


Balance 180  180











Med Orders - Current: 


                               Current Medications





Acetaminophen (Acetaminophen 500 Mg Tab)  1,000 mg PO BID PRN


   PRN Reason: Pain/Fever


   Last Admin: 03/28/21 21:46 Dose:  1,000 mg


   Documented by: 


Acetaminophen (Acetaminophen 325 Mg Tab)  650 mg PO TID@0800,1300,2000 [facility]


   Last Admin: 06/07/21 07:31 Dose:  650 mg


   Documented by: 


Buspirone HCl (Buspirone 15 Mg Tab (Own Supply))  7.5 mg PO TID@0800,1300,2000 

[facility]


   Last Admin: 06/07/21 07:29 Dose:  7.5 mg


   Documented by: 


Calcium Carbonate/Glycine (Calcium Carbonate 750 Mg Tab.Chew)  750 mg PO TID PRN


   PRN Reason: Dyspepsia


   Last Admin: 06/01/21 23:38 Dose:  750 mg


   Documented by: 


Cholecalciferol (Cholecalciferol (Vitamin D3) 25 Mcg Tab)  50 mcg PO DAILY [facility]


   Last Admin: 06/07/21 07:30 Dose:  50 mcg


   Documented by: 


Docusate Sodium (Docusate Sodium 100 Mg Cap)  100 mg PO DAILY PRN


   PRN Reason: Constipation


Famotidine (Famotidine 20 Mg Tab)  20 mg PO BEDTIME [facility]


   Last Admin: 06/06/21 19:57 Dose:  20 mg


   Documented by: 


Ferrous Sulfate (Ferrous Sulfate 325 Mg Tab)  325 mg PO Q48H [facility]


   Last Admin: 06/06/21 07:52 Dose:  325 mg


   Documented by: 


Furosemide (Furosemide 20 Mg Tab (Own Suppy))  20 mg PO BID@0800,1300 [facility]


   Last Admin: 06/07/21 07:29 Dose:  20 mg


   Documented by: 


Gabapentin (Gabapentin 100 Mg Cap (Own Supply))  100 mg PO BID@0800,1300 [facility]


   Last Admin: 06/07/21 07:28 Dose:  100 mg


   Documented by: 


Gabapentin (Gabapentin 300 Mg Cap (Own Supply))  300 mg PO BEDTIME [facility]


   Last Admin: 06/06/21 19:59 Dose:  300 mg


   Documented by: 


Isosorbide Mononitrate (Isosorbide Mononitrate 30 Mg Tab.Er (Own Supply))  30 mg

 PO DAILY [facility]


   Last Admin: 06/07/21 07:29 Dose:  30 mg


   Documented by: 


Loperamide HCl (Loperamide 2 Mg Cap)  2 mg PO Q12H PRN


   PRN Reason: Diarrhea


   Last Admin: 03/24/21 09:39 Dose:  2 mg


   Documented by: 


Melatonin (Melatonin 3 Mg Tab)  6 mg PO BEDTIME [facility]


   Last Admin: 06/06/21 19:56 Dose:  6 mg


   Documented by: 


Metoprolol Succinate (Metoprolol Succinate 25 Mg Tab.Er (Own Supply))  25 mg PO 

DAILY [facility]


   Last Admin: 06/07/21 07:29 Dose:  25 mg


   Documented by: 


Miconazole (Miconazole 2% Top Powder 45 Gm Container)  0 gm TOP BID PRN


   PRN Reason: RASH UNDER BILATERAL BREASTS


   Last Admin: 04/01/21 20:26 Dose:  1 applic


   Documented by: 


Mineral Oil/White Petrolatum (Mineral Oil/Petrolatum,White Crm 454 Gm Jar)  0 gm

 TOP BID [facility]


   Last Admin: 06/07/21 07:30 Dose:  1 applic


   Documented by: 


Multivitamins/Minerals (Multivitamins With Iron/Calcium/Folic Acid/Minerals Tab)

  1 tab PO DAILY [facility]


   Last Admin: 06/07/21 07:30 Dose:  1 tab


   Documented by: 


Nitroglycerin (Nitroglycerin 0.4 Mg Tab.Sl (Own Supply))  0.4 mg SL Q5M PRN


   PRN Reason: Chest Pain


   Last Admin: 02/19/21 11:11 Dose:  0.4 mg


   Documented by: 


Probiotic Gummies  2 each PO DAILY@0700 [facility]


   Last Admin: 06/07/21 06:22 Dose:  2 each


   Documented by: 


Omeprazole (Omeprazole 20 Mg Cap.Cr)  20 mg PO DAILY@0700 [facility]


   Last Admin: 06/07/21 06:22 Dose:  20 mg


   Documented by: 


Ondansetron HCl (Ondansetron 4 Mg Tab.Dis (Own Supply))  4 mg PO Q4H PRN


   PRN Reason: Nausea/Vomiting


   Last Admin: 04/05/21 18:25 Dose:  4 mg


   Documented by: 


Phytonadione (Phytonadione 100 Mcg Tab)  100 mcg PO DAILY [facility]


   Last Admin: 06/07/21 07:31 Dose:  100 mcg


   Documented by: 


Polyethylene Glycol (Polyethylene Glycol 3350 Powder 17 Gm Packet)  17 gm PO 

DAILY PRN


   PRN Reason: Constipation


Ropinirole HCl (Ropinirole 0.5 Mg Tab (Own Supply))  0.5 mg PO BEDTIME [facility]


   Last Admin: 06/06/21 19:59 Dose:  0.5 mg


   Documented by: 


Senna/Docusate Sodium (Docusate Sodium/Sennosides 50-8.6 Mg Tab)  1 tab PO DAILY

 PRN


   PRN Reason: Constipation


Sertraline HCl (Sertraline 100 Mg Tab (Own Supply))  100 mg PO DAILY [facility]


   Last Admin: 06/07/21 07:29 Dose:  100 mg


   Documented by: 


Vitamin B Complex (Vitamin B Complex Tab)  1 each PO DAILY [facility]


   Last Admin: 06/07/21 07:31 Dose:  1 each


   Documented by: 


Warfarin Sodium (Warfarin 5 Mg Tab (Own Supply))  5 mg PO SuTuThSa@2000 [facility]


   Last Admin: 06/06/21 19:59 Dose:  5 mg


   Documented by: 


Warfarin Sodium (Warfarin 5 Mg Tab (Own Supply))  2.5 mg PO MoWeFr@2000 [facility]


   Last Admin: 06/04/21 20:11 Dose:  2.5 mg


   Documented by: 





Discontinued Medications





Buspirone HCl (Buspirone 5 Mg Tab (Own Supply))  5 mg PO TID@0800,1300,2000 [facility]


   Last Admin: 03/10/21 09:19 Dose:  Not Given


   Documented by: 


Buspirone HCl (Buspirone 5 Mg Tab (Own Supply))  7.5 mg PO TID@0800,1300,2000 

[facility]


   Stop: 03/31/21 13:01


   Last Admin: 03/31/21 13:55 Dose:  7.5 mg


   Documented by: 


Buspirone HCl (Buspirone 5 Mg Tab (Own Supply))  5 mg PO BID [facility]


   Last Admin: 02/08/21 08:34 Dose:  5 mg


   Documented by: 


Cephalexin (Cephalexin 500 Mg Cap (Own Supply))  500 mg PO Q6HR [facility]


   Stop: 04/27/21 08:16


   Last Admin: 04/21/21 06:39 Dose:  500 mg


   Documented by: 


Cephalexin (Cephalexin 500 Mg Cap (Own Supply))  500 mg PO TID@0800,1400,2000 

[facility]


   Stop: 04/27/21 20:00


   Last Admin: 04/23/21 08:06 Dose:  500 mg


   Documented by: 


Cholecalciferol (Cholecalciferol (Vitamin D3) 10 Mcg Tab)  40 mcg PO DAILY [facility]


   Last Admin: 04/21/21 13:53 Dose:  Not Given


   Documented by: 


Ciprofloxacin (Ciprofloxacin 500 Mg Tab)  500 mg PO Q24H [facility]


   Stop: 10/22/20 20:01


   Last Admin: 10/22/20 19:24 Dose:  500 mg


   Documented by: 


Ciprofloxacin (Ciprofloxacin 500 Mg Tab (Own Supply))  500 mg PO BID@0700,2000 

[facility]


   Stop: 05/02/21 20:01


   Last Admin: 05/02/21 19:44 Dose:  500 mg


   Documented by: 


Famotidine (Famotidine 20 Mg Tab)  20 mg PO DAILY [facility]


   Last Admin: 10/09/20 08:14 Dose:  20 mg


   Documented by: 


Furosemide (Furosemide 20 Mg Tab (Own Supply))  20 mg PO DAILY@1200 [facility]


   Stop: 12/06/20 12:01


   Last Admin: 12/06/20 12:16 Dose:  20 mg


   Documented by: 


Furosemide (Furosemide 20 Mg Tab)  20 mg PO BIDDIURETIC [facility]


   Stop: 04/02/21 23:59


Furosemide (Furosemide 20 Mg Tab (Own Supply))  20 mg PO DAILY [facility]


   Last Admin: 04/05/21 08:41 Dose:  20 mg


   Documented by: 


Furosemide (Furosemide 20 Mg Tab)  20 mg PO 0800,1400 [facility]


   Stop: 04/02/21 14:01


   Last Admin: 03/31/21 13:55 Dose:  20 mg


   Documented by: 


Furosemide (Furosemide 20 Mg Tab (Own Supply))  20 mg PO BID@0800,1400 [facility]


   Stop: 04/02/21 14:01


   Last Admin: 04/02/21 13:20 Dose:  20 mg


   Documented by: 


Furosemide (Furosemide 20 Mg Tab (Own Supply))  20 mg PO DAILY [facility]


   Last Admin: 03/28/21 08:04 Dose:  20 mg


   Documented by: 


Lactobacillus Rhamnosus (Lactobacillus Rhamnosus Gg (Probiotic) Cap)  1 cap PO 

DAILY [facility]


   Last Admin: 03/21/21 08:32 Dose:  1 cap


   Documented by: 


Lactobacillus Rhamnosus (Lactobacillus Rhamnosus Gg (Probiotic) Cap)  1 cap PO 

DAILY@0700 [facility]


   Last Admin: 04/21/21 06:38 Dose:  1 cap


   Documented by: 


Nitrofurantoin Macrocrystals (Nitrofurantoin Monohydrate/Macrocrystalline 100 Mg

 Cap)  100 mg PO BID [facility]


   Stop: 10/25/20 08:31


   Last Admin: 10/18/20 09:00 Dose:  100 mg


   Documented by: 


Nitrofurantoin Macrocrystals (Nitrofurantoin Monohydrate/Macrocrystalline 100 Mg

 Cap)  100 mg PO BID [facility]


   Stop: 03/24/21 20:01


   Last Admin: 03/24/21 19:59 Dose:  100 mg


   Documented by: 


Probiotic Gummies  2 each PO BID@10,22 [facility]


   Stop: 05/19/21 22:00


   Last Admin: 05/09/21 09:51 Dose:  2 each


   Documented by: 


Probiotic Gummies  2 each PO BID@0800,2000 [facility]


   Stop: 05/19/21 20:01


   Last Admin: 05/19/21 19:27 Dose:  2 each


   Documented by: 


Omeprazole (Omeprazole 20 Mg Cap.Cr)  20 mg PO DAILY [facility]


   Last Admin: 10/09/20 08:14 Dose:  20 mg


   Documented by: 


Ondansetron HCl (Ondansetron 4 Mg Tab.Dis)  4 mg PO Q4H PRN


   PRN Reason: Nausea/Vomiting


Warfarin Sodium (Warfarin 5 Mg Tab (Own Supply))  2.5 mg PO ONETIME ONE


   Stop: 04/29/21 10:01


   Last Admin: 04/29/21 11:11 Dose:  2.5 mg


   Documented by: 


Warfarin Sodium (Warfarin 5 Mg Tab (Own Supply))  5 mg PO ONETIME ONE


   Stop: 05/03/21 15:01


   Last Admin: 05/03/21 16:57 Dose:  5 mg


   Documented by: 


Warfarin Sodium (Warfarin 5 Mg Tab (Own Med))  5 mg PO SuMoTuWeFrSa@2000 [facility]


   Last Admin: 02/21/21 19:22 Dose:  5 mg


   Documented by: 


Warfarin Sodium (Warfarin 5 Mg Tab (Own Med))  2.5 mg PO Th@2000 [facility]


   Last Admin: 02/18/21 19:28 Dose:  2.5 mg


   Documented by: 











- Exam


Quality Assessment: Reports: Supplemental Oxygen


General: Reports: Alert, Oriented


HEENT: Reports: EOMI, Mucous Membr. Moist/Pink


Neck: Reports: Supple


Lungs: Reports: Clear to Auscultation, Normal Respiratory Effort


Cardiovascular: Reports: Regular Rate, Regular Rhythm


GI/Abdominal Exam: Normal Bowel Sounds, Soft, Non-Tender


Extremities: Pedal Edema (1+ to the lower shin)


Skin: Reports: Warm, Dry


Wound/Incisions: Reports: Dressing Dry and Intact (right lower extremity 

superficial ulcer healing slowly but well)


Neurological: Reports: No New Focal Deficit


Psy/Mental Status: Reports: Alert, Normal Affect, Normal Mood Grossly Intact

## 2021-06-21 ENCOUNTER — APPOINTMENT (OUTPATIENT)
Dept: FAMILY MEDICINE | Facility: CLINIC | Age: 66
End: 2021-06-21
Payer: COMMERCIAL

## 2021-06-21 PROCEDURE — 99213 OFFICE O/P EST LOW 20 MIN: CPT | Mod: 95

## 2021-06-21 NOTE — HISTORY OF PRESENT ILLNESS
[Home] : at home, [unfilled] , at the time of the visit. [Medical Office: ([facility])___] : at the medical office located in  [Verbal consent obtained from patient] : the patient, [unfilled] [FreeTextEntry8] :  66 y/o F presents via telehealth c/o sinus congestion.pt Date for the last 7 days he's been having right cheek pain and right ear pain and sinus congestion. Patient also states pain in her right jaw. Denies any fever or chills. Admits to nasal stuffiness eyes watery. Denies any seasonal allergies. Also states in the last day or 2 she has noticed her urine is darker despite drinking water. No urinary complaints of dysuria or frequency

## 2021-08-19 NOTE — OCCUPATIONAL THERAPY INITIAL EVALUATION ADULT - PHYSICAL ASSIST/NONPHYSICAL ASSIST: SIT/STAND, REHAB EVAL
Patient scheduled.   Laney Moore CMA     
Please help to get him in within 7 days after discharge from the hospital.  Okay to see any available providers.  
Reason for call:  Other   Patient called regarding (reason for call): appointment  Additional comments:   Patient needs a Hospital F/U.  Please call patient to schedule. There are no in person visits available.     Phone number to reach patient:  Home number on file [phone] (home) or Cell number on file:    Telephone Information:   Mobile [phone]       Best Time:  any    Can we leave a detailed message on this number?  unknown    Travel screening: Not Applicable    
verbal cues

## 2021-08-20 ENCOUNTER — NON-APPOINTMENT (OUTPATIENT)
Age: 66
End: 2021-08-20

## 2021-10-28 ENCOUNTER — APPOINTMENT (OUTPATIENT)
Dept: ORTHOPEDIC SURGERY | Facility: CLINIC | Age: 66
End: 2021-10-28
Payer: COMMERCIAL

## 2021-10-28 PROCEDURE — 73564 X-RAY EXAM KNEE 4 OR MORE: CPT | Mod: RT

## 2021-10-28 PROCEDURE — 99212 OFFICE O/P EST SF 10 MIN: CPT

## 2021-10-28 NOTE — PHYSICAL EXAM
[de-identified] : Patient is well nourished, well-developed, in no acute distress, with appropriate mood and affect. The patient is oriented to time, place, and person. Respirations are even and unlabored. Examination of the right knee reveals satisfactory wound healing. No surrounding erythema. Motion is good and relatively pain free. Active knee flexion is greater than 90 degrees.  Range of motion is 0-110.\par Left knee motion is painless and the knee moves from 0 to 135 degrees. The knee is stable within that range-of-motion to AP and ML stress with a 1A Lachman, negative anterior or posterior drawer and no instability to varus or valgus stress. The alignment of the knee is 5 degrees varus. No effusion or crepitus is noted.  Mild prominence of the tibial tubercle which appears bony.  No fluctuance.  Nonmobile.  No tenderness to palpation about the medial or lateral joint line, medial or lateral tibial plateau, medial or lateral femoral condyle, medial or lateral patellar facets, superior or inferior pole of the patella. Jr's is negative. Muscle strength is normal. Pedal pulses are palpable. Hip examination was negative. [de-identified] : AP, lateral, sunrise knee x-rays of the right knee were ordered and obtained in the office and demonstrate satisfactory position and alignment of the components are present. No signs of loosening are seen.

## 2021-10-28 NOTE — DISCUSSION/SUMMARY
[de-identified] : The patient is doing well after right total knee arthroplasty.  Continue to weight-bear as tolerated.  Continue work with physical therapy and achieving more range of motion.  Follow-up is recommended in 1 year anniversary of the right total knee arthroplasty.\par

## 2021-10-28 NOTE — HISTORY OF PRESENT ILLNESS
[de-identified] : This is a very pleasant 65-year-old female who is status-post right total knee arthroplasty here for routine postoperative evaluation. Excellent progress is noted in terms of pain and restoration of function. Pain is well controlled with oral medications. There has been no change in medical health since discharge. The patient does not require assistive devices. \par \par

## 2021-11-01 ENCOUNTER — APPOINTMENT (OUTPATIENT)
Dept: DERMATOLOGY | Facility: CLINIC | Age: 66
End: 2021-11-01
Payer: COMMERCIAL

## 2021-11-01 VITALS — WEIGHT: 185 LBS | BODY MASS INDEX: 28.7 KG/M2 | HEIGHT: 67.5 IN

## 2021-11-01 PROCEDURE — 99213 OFFICE O/P EST LOW 20 MIN: CPT

## 2021-12-31 ENCOUNTER — APPOINTMENT (OUTPATIENT)
Dept: MAMMOGRAPHY | Facility: CLINIC | Age: 66
End: 2021-12-31
Payer: COMMERCIAL

## 2021-12-31 ENCOUNTER — APPOINTMENT (OUTPATIENT)
Dept: ULTRASOUND IMAGING | Facility: CLINIC | Age: 66
End: 2021-12-31
Payer: COMMERCIAL

## 2021-12-31 PROCEDURE — 77067 SCR MAMMO BI INCL CAD: CPT

## 2021-12-31 PROCEDURE — 77063 BREAST TOMOSYNTHESIS BI: CPT

## 2021-12-31 PROCEDURE — 76641 ULTRASOUND BREAST COMPLETE: CPT | Mod: 50

## 2022-03-04 ENCOUNTER — APPOINTMENT (OUTPATIENT)
Dept: ORTHOPEDIC SURGERY | Facility: CLINIC | Age: 67
End: 2022-03-04
Payer: COMMERCIAL

## 2022-03-04 DIAGNOSIS — Z96.651 PRESENCE OF RIGHT ARTIFICIAL KNEE JOINT: ICD-10-CM

## 2022-03-04 PROCEDURE — 99213 OFFICE O/P EST LOW 20 MIN: CPT

## 2022-03-04 NOTE — DISCUSSION/SUMMARY
[de-identified] : The patient is doing well after right total knee arthroplasty.  Continue to weight-bear as tolerated.  She is not exercising so I suggested a HEP.  Follow-up is recommended every3-5 years for long term monitoring.\par

## 2022-03-04 NOTE — PHYSICAL EXAM
[de-identified] : Patient is well nourished, well-developed, in no acute distress, with appropriate mood and affect. The patient is oriented to time, place, and person. Respirations are even and unlabored. Examination of the right knee revealsa well healed surgical scar. No surrounding erythema. Motion is good and relatively pain free. Range of motion is 0-120

## 2022-03-04 NOTE — HISTORY OF PRESENT ILLNESS
[de-identified] : This is a very pleasant 66-year-old female who is status-post right total knee arthroplasty here for routine postoperative evaluation. 1 year from surgery. Thrilled with her outcome. No complaints. Doing all adls. No cane/walker.\par \par

## 2022-03-24 ENCOUNTER — APPOINTMENT (OUTPATIENT)
Dept: OTOLARYNGOLOGY | Facility: CLINIC | Age: 67
End: 2022-03-24
Payer: COMMERCIAL

## 2022-03-24 VITALS
SYSTOLIC BLOOD PRESSURE: 119 MMHG | HEART RATE: 60 BPM | WEIGHT: 180 LBS | DIASTOLIC BLOOD PRESSURE: 70 MMHG | BODY MASS INDEX: 27.92 KG/M2 | HEIGHT: 67.5 IN

## 2022-03-24 DIAGNOSIS — Z00.00 ENCOUNTER FOR GENERAL ADULT MEDICAL EXAMINATION W/OUT ABNORMAL FINDINGS: ICD-10-CM

## 2022-03-24 PROCEDURE — 31231 NASAL ENDOSCOPY DX: CPT

## 2022-03-24 PROCEDURE — 99204 OFFICE O/P NEW MOD 45 MIN: CPT | Mod: 25

## 2022-03-24 RX ORDER — AMOXICILLIN AND CLAVULANATE POTASSIUM 875; 125 MG/1; MG/1
875-125 TABLET, COATED ORAL TWICE DAILY
Qty: 14 | Refills: 0 | Status: DISCONTINUED | COMMUNITY
Start: 2021-06-21 | End: 2022-03-24

## 2022-03-24 RX ORDER — AMMONIUM LACTATE 12 %
12 CREAM (GRAM) TOPICAL TWICE DAILY
Qty: 1 | Refills: 2 | Status: DISCONTINUED | COMMUNITY
Start: 2020-10-05 | End: 2022-03-24

## 2022-03-24 RX ORDER — KETOCONAZOLE 20 MG/G
2 CREAM TOPICAL TWICE DAILY
Qty: 1 | Refills: 2 | Status: DISCONTINUED | COMMUNITY
Start: 2020-06-15 | End: 2022-03-24

## 2022-03-24 RX ORDER — TRIAMCINOLONE ACETONIDE 1 MG/G
0.1 OINTMENT TOPICAL
Qty: 1 | Refills: 1 | Status: DISCONTINUED | COMMUNITY
Start: 2020-06-15 | End: 2022-03-24

## 2022-03-24 NOTE — HISTORY OF PRESENT ILLNESS
[de-identified] : 66 year old female presents for evaluation chronic nasal congestion\par 8 months of difficulty breathing, persistent PND but unable to bring up\par Contributes to ear fullness\par Reports constant nasal congestion, PND\par Denies anterior rhinorrhea, facial pain/pressure, good sense of smell\par Globus sensation, frequent throat clearing\par PO steroids did not help\par Occasional epistaxis, lasts a few minutes, resolves on its own\par No recurrent sinus infections\par Occasional use of saline and Zyrtec without noted relief \par Sense of smell is good\par Denies scans of the sinuses\par \par \par PMH: melanoma of the back, HTN, \par PSH: knee replacement, melanoma excision

## 2022-04-07 NOTE — PHYSICAL THERAPY INITIAL EVALUATION ADULT - LIVES WITH, PROFILE
Brief Operative Note    Patient: Chantell Flores 53 year old female    MRN: [id number]    Surgeon(s): Juan Barry MD  Phone Number: [phone]                       Surgeon(s) and Role:     * Juan Barry MD - Primary    Assistant(s): SONIYA Flowers-C    Pre-Op Diagnosis: IMPINGEMENT SYNDROME OF RIGHT SHOULDER, PARTIAL TEAR OF RIGHT ROTATOR CUFF     Post-Op Diagnosis: IMPINGEMENT SYNDROME OF RIGHT SHOULDER, PARTIAL TEAR OF RIGHT ROTATOR CUFF, LABRAL TEAR     Procedure: Procedure(s):  RIGHT SHOULDER ARTHROSCOPY, ROTATOR CUFF DEBRIDEMENT, SUB ACROMIAL DECOMPRESSION, LABRAL DEBRIDEMENT    Anesthesia Type: General, block, local                                   Complications: None    Findings: see full operative report    Specimens Removed: No specimens collected     Estimated Blood Loss: min    Assistant Tasks: SONIYA Andrade assisted throughout the entire length of the case, helping hold deep retractors, close the skin, position the patient, and applied the dressing.  Her assistance was essential to the safe and successful completion of this orthopedic procedure.       Implants: * No implants in log *      I was present for the key portions of the procedure and was immediately available for the non-key portions         spouse

## 2022-04-19 ENCOUNTER — APPOINTMENT (OUTPATIENT)
Dept: FAMILY MEDICINE | Facility: CLINIC | Age: 67
End: 2022-04-19
Payer: COMMERCIAL

## 2022-04-19 ENCOUNTER — NON-APPOINTMENT (OUTPATIENT)
Age: 67
End: 2022-04-19

## 2022-04-19 VITALS
HEART RATE: 66 BPM | BODY MASS INDEX: 30.37 KG/M2 | HEIGHT: 67.5 IN | SYSTOLIC BLOOD PRESSURE: 122 MMHG | WEIGHT: 195.8 LBS | TEMPERATURE: 97.5 F | DIASTOLIC BLOOD PRESSURE: 70 MMHG | RESPIRATION RATE: 16 BRPM | OXYGEN SATURATION: 99 %

## 2022-04-19 DIAGNOSIS — E78.5 HYPERLIPIDEMIA, UNSPECIFIED: ICD-10-CM

## 2022-04-19 DIAGNOSIS — Z11.59 ENCOUNTER FOR SCREENING FOR OTHER VIRAL DISEASES: ICD-10-CM

## 2022-04-19 DIAGNOSIS — E66.9 OBESITY, UNSPECIFIED: ICD-10-CM

## 2022-04-19 DIAGNOSIS — Z00.00 ENCOUNTER FOR GENERAL ADULT MEDICAL EXAMINATION W/OUT ABNORMAL FINDINGS: ICD-10-CM

## 2022-04-19 DIAGNOSIS — I10 ESSENTIAL (PRIMARY) HYPERTENSION: ICD-10-CM

## 2022-04-19 DIAGNOSIS — R73.03 PREDIABETES.: ICD-10-CM

## 2022-04-19 LAB
25(OH)D3 SERPL-MCNC: 35 NG/ML
ALBUMIN SERPL ELPH-MCNC: 4.4 G/DL
ALP BLD-CCNC: 81 U/L
ALT SERPL-CCNC: 16 U/L
ANION GAP SERPL CALC-SCNC: 12 MMOL/L
APPEARANCE: ABNORMAL
AST SERPL-CCNC: 10 U/L
BACTERIA: ABNORMAL
BASOPHILS # BLD AUTO: 0.05 K/UL
BASOPHILS NFR BLD AUTO: 0.7 %
BILIRUB SERPL-MCNC: 0.3 MG/DL
BILIRUBIN URINE: NEGATIVE
BLOOD URINE: NEGATIVE
BUN SERPL-MCNC: 22 MG/DL
CALCIUM SERPL-MCNC: 9.1 MG/DL
CHLORIDE SERPL-SCNC: 105 MMOL/L
CHOLEST SERPL-MCNC: 211 MG/DL
CO2 SERPL-SCNC: 25 MMOL/L
COLOR: YELLOW
CREAT SERPL-MCNC: 0.77 MG/DL
EGFR: 86 ML/MIN/1.73M2
EOSINOPHIL # BLD AUTO: 0.18 K/UL
EOSINOPHIL NFR BLD AUTO: 2.6 %
ESTIMATED AVERAGE GLUCOSE: 123 MG/DL
FOLATE SERPL-MCNC: 8.2 NG/ML
GLUCOSE QUALITATIVE U: NEGATIVE
GLUCOSE SERPL-MCNC: 118 MG/DL
HBA1C MFR BLD HPLC: 5.9 %
HCT VFR BLD CALC: 40.1 %
HDLC SERPL-MCNC: 64 MG/DL
HGB BLD-MCNC: 13.1 G/DL
HYALINE CASTS: 0 /LPF
IMM GRANULOCYTES NFR BLD AUTO: 0.3 %
KETONES URINE: NEGATIVE
LDLC SERPL CALC-MCNC: 135 MG/DL
LEUKOCYTE ESTERASE URINE: ABNORMAL
LYMPHOCYTES # BLD AUTO: 2.1 K/UL
LYMPHOCYTES NFR BLD AUTO: 29.9 %
MAN DIFF?: NORMAL
MCHC RBC-ENTMCNC: 29.2 PG
MCHC RBC-ENTMCNC: 32.7 GM/DL
MCV RBC AUTO: 89.3 FL
MICROSCOPIC-UA: NORMAL
MONOCYTES # BLD AUTO: 0.43 K/UL
MONOCYTES NFR BLD AUTO: 6.1 %
NEUTROPHILS # BLD AUTO: 4.25 K/UL
NEUTROPHILS NFR BLD AUTO: 60.4 %
NITRITE URINE: NEGATIVE
NONHDLC SERPL-MCNC: 147 MG/DL
PH URINE: 6
PLATELET # BLD AUTO: 308 K/UL
POTASSIUM SERPL-SCNC: 4.3 MMOL/L
PROT SERPL-MCNC: 6.8 G/DL
PROTEIN URINE: ABNORMAL
RBC # BLD: 4.49 M/UL
RBC # FLD: 11.9 %
RED BLOOD CELLS URINE: 2 /HPF
SODIUM SERPL-SCNC: 142 MMOL/L
SPECIFIC GRAVITY URINE: 1.03
SQUAMOUS EPITHELIAL CELLS: 16 /HPF
TRIGL SERPL-MCNC: 61 MG/DL
TSH SERPL-ACNC: 3.54 UIU/ML
UROBILINOGEN URINE: NORMAL
VIT B12 SERPL-MCNC: 377 PG/ML
WBC # FLD AUTO: 7.03 K/UL
WHITE BLOOD CELLS URINE: 23 /HPF

## 2022-04-19 PROCEDURE — 99396 PREV VISIT EST AGE 40-64: CPT | Mod: 25

## 2022-04-19 PROCEDURE — G0447 BEHAVIOR COUNSEL OBESITY 15M: CPT

## 2022-04-19 PROCEDURE — 93000 ELECTROCARDIOGRAM COMPLETE: CPT

## 2022-04-19 NOTE — HISTORY OF PRESENT ILLNESS
Pre-Surgery Instructions:   Medication Instructions    Ascorbic Acid (VITAMIN C PO) Instructed patient per Anesthesia Guidelines   cholecalciferol (VITAMIN D3) 1,000 units tablet Instructed patient per Anesthesia Guidelines   ferrous sulfate 325 (65 Fe) mg tablet Instructed patient per Anesthesia Guidelines   RA VITAMIN B-12 TR 1000 MCG TBCR Instructed patient per Anesthesia Guidelines  No meds needed am of surgery per anesthesia guidelines  [de-identified] : 67 y/o F PMhx HTN (Losartan/HCTZ) and currently taking Flonase,and Omeprazole for sinus congestion. UTD with Covid shots. Pt c/o right hand numb tingling and some pain only in am.  No weakness of right handing shaking  hand does help numbness.  Sleeps on her side. On Computer few hours a day. Right hand dominant. Off and on for  1 month Denies neck pain. Gained >10 pounds in 1 year. Under stress taking care of her mother who has dementia.

## 2022-04-22 ENCOUNTER — NON-APPOINTMENT (OUTPATIENT)
Age: 67
End: 2022-04-22

## 2022-04-22 LAB
COVID-19 NUCLEOCAPSID  GAM ANTIBODY INTERPRETATION: NEGATIVE
COVID-19 SPIKE DOMAIN ANTIBODY INTERPRETATION: POSITIVE
SARS-COV-2 AB SERPL IA-ACNC: >250 U/ML
SARS-COV-2 AB SERPL QL IA: 0.07 INDEX

## 2022-05-02 ENCOUNTER — APPOINTMENT (OUTPATIENT)
Dept: DERMATOLOGY | Facility: CLINIC | Age: 67
End: 2022-05-02
Payer: COMMERCIAL

## 2022-05-02 PROCEDURE — 99213 OFFICE O/P EST LOW 20 MIN: CPT

## 2022-06-08 ENCOUNTER — APPOINTMENT (OUTPATIENT)
Dept: OTOLARYNGOLOGY | Facility: CLINIC | Age: 67
End: 2022-06-08
Payer: COMMERCIAL

## 2022-06-08 VITALS
SYSTOLIC BLOOD PRESSURE: 111 MMHG | WEIGHT: 185 LBS | HEART RATE: 75 BPM | BODY MASS INDEX: 28.7 KG/M2 | DIASTOLIC BLOOD PRESSURE: 74 MMHG | HEIGHT: 67.5 IN

## 2022-06-08 PROCEDURE — 31231 NASAL ENDOSCOPY DX: CPT

## 2022-06-08 PROCEDURE — 99214 OFFICE O/P EST MOD 30 MIN: CPT | Mod: 25

## 2022-06-08 RX ORDER — FAMOTIDINE 10 MG/1
10 TABLET, FILM COATED ORAL
Qty: 30 | Refills: 3 | Status: DISCONTINUED | COMMUNITY
Start: 2022-03-24 | End: 2022-06-08

## 2022-06-08 NOTE — HISTORY OF PRESENT ILLNESS
[de-identified] : 66 year old female hx chronic nasal congestion presents for follow up \par LCV 03/24/22\par Used flonase, omeprazole consistently\par NO change in sx\par Continues to experience feeling that mucus in nose is there and can't get out, congestion\par Denies anterior rhinorrhea, facial pain/pressure, recent fevers, sinus infections or epistaxis \par Sense of smell is good \par Occasional  globus sensation\par Reports ears feel clogged. Denies otalgia, otorrhea, tinnitus.\par \par

## 2022-07-24 ENCOUNTER — NON-APPOINTMENT (OUTPATIENT)
Age: 67
End: 2022-07-24

## 2022-08-11 ENCOUNTER — NON-APPOINTMENT (OUTPATIENT)
Age: 67
End: 2022-08-11

## 2022-09-08 ENCOUNTER — RX RENEWAL (OUTPATIENT)
Age: 67
End: 2022-09-08

## 2022-09-08 ENCOUNTER — OUTPATIENT (OUTPATIENT)
Dept: OUTPATIENT SERVICES | Facility: HOSPITAL | Age: 67
LOS: 1 days | End: 2022-09-08
Payer: COMMERCIAL

## 2022-09-08 ENCOUNTER — APPOINTMENT (OUTPATIENT)
Dept: CT IMAGING | Facility: IMAGING CENTER | Age: 67
End: 2022-09-08

## 2022-09-08 DIAGNOSIS — Z98.1 ARTHRODESIS STATUS: Chronic | ICD-10-CM

## 2022-09-08 DIAGNOSIS — Z98.891 HISTORY OF UTERINE SCAR FROM PREVIOUS SURGERY: Chronic | ICD-10-CM

## 2022-09-08 DIAGNOSIS — J01.90 ACUTE SINUSITIS, UNSPECIFIED: ICD-10-CM

## 2022-09-08 DIAGNOSIS — Z98.890 OTHER SPECIFIED POSTPROCEDURAL STATES: Chronic | ICD-10-CM

## 2022-09-08 DIAGNOSIS — J32.9 CHRONIC SINUSITIS, UNSPECIFIED: ICD-10-CM

## 2022-09-08 PROCEDURE — 70486 CT MAXILLOFACIAL W/O DYE: CPT | Mod: 26

## 2022-09-08 PROCEDURE — 70486 CT MAXILLOFACIAL W/O DYE: CPT

## 2022-09-18 ENCOUNTER — TRANSCRIPTION ENCOUNTER (OUTPATIENT)
Age: 67
End: 2022-09-18

## 2022-09-19 ENCOUNTER — APPOINTMENT (OUTPATIENT)
Dept: FAMILY MEDICINE | Facility: CLINIC | Age: 67
End: 2022-09-19

## 2022-09-19 DIAGNOSIS — U07.1 COVID-19: ICD-10-CM

## 2022-09-19 PROCEDURE — 99213 OFFICE O/P EST LOW 20 MIN: CPT | Mod: 95

## 2022-09-19 NOTE — PHYSICAL EXAM
[Normal] : no acute distress, well nourished, well developed and well-appearing [de-identified] : speaking in complete sentences

## 2022-09-19 NOTE — HISTORY OF PRESENT ILLNESS
[FreeTextEntry8] : c/o Covid positive \par c/o cough and congestion\par sx have been ongoing since yesterday\par symtoms include: sore throat, headache, fatigue, fever \par denies any cough or chest pain \par Sick contacts-  since friday \par recent travel- none\par self treatment- started one day of pills of 's\par tested prior- none \par vaccine status- vaccinated, and received first booster  \par denies any other complaints or concerns at this time\par

## 2022-09-19 NOTE — REVIEW OF SYSTEMS
[Fever] : fever [Chills] : chills [Fatigue] : fatigue [Nasal Discharge] : nasal discharge [Negative] : Respiratory

## 2022-09-19 NOTE — ASSESSMENT
[FreeTextEntry1] : reviewed treatment options with patient including  paxlovid, and conservative treatment \par paxlovid started on her own, medication as prescribed, med ed done\par advised to increase fluid intake, rest, and acetaminophen/ibuprofen prn pain/fever\par advised to c/w isolation for 5 days from positive test if sx resolving, c/w mask post isolation period \par advised if sx worsens- including but not limited to sob- to go to ED \par advised to follow up if sx persists \par patient verbalizes understanding and is stable upon d/c\par

## 2022-09-21 ENCOUNTER — NON-APPOINTMENT (OUTPATIENT)
Age: 67
End: 2022-09-21

## 2022-09-26 ENCOUNTER — NON-APPOINTMENT (OUTPATIENT)
Age: 67
End: 2022-09-26

## 2022-09-27 ENCOUNTER — APPOINTMENT (OUTPATIENT)
Dept: OTOLARYNGOLOGY | Facility: CLINIC | Age: 67
End: 2022-09-27

## 2022-09-27 VITALS
TEMPERATURE: 97.6 F | DIASTOLIC BLOOD PRESSURE: 85 MMHG | HEART RATE: 65 BPM | WEIGHT: 188 LBS | BODY MASS INDEX: 29.16 KG/M2 | SYSTOLIC BLOOD PRESSURE: 151 MMHG | HEIGHT: 67.5 IN

## 2022-09-27 PROCEDURE — 99214 OFFICE O/P EST MOD 30 MIN: CPT | Mod: 25

## 2022-09-27 PROCEDURE — 31231 NASAL ENDOSCOPY DX: CPT

## 2022-09-27 RX ORDER — FLUTICASONE PROPIONATE 50 UG/1
50 SPRAY, METERED NASAL
Qty: 1 | Refills: 3 | Status: DISCONTINUED | COMMUNITY
Start: 2022-03-24 | End: 2022-09-27

## 2022-09-27 RX ORDER — NIRMATRELVIR AND RITONAVIR 300-100 MG
20 X 150 MG & KIT ORAL
Qty: 30 | Refills: 0 | Status: DISCONTINUED | COMMUNITY
Start: 2022-09-19 | End: 2022-09-27

## 2022-09-27 RX ORDER — MOMETASONE FUROATE 100 %
POWDER (GRAM) MISCELLANEOUS
Qty: 1 | Refills: 0 | Status: DISCONTINUED | COMMUNITY
Start: 2022-06-08 | End: 2022-09-27

## 2022-09-27 RX ORDER — OMEPRAZOLE 20 MG/1
20 CAPSULE, DELAYED RELEASE ORAL DAILY
Qty: 30 | Refills: 3 | Status: DISCONTINUED | COMMUNITY
Start: 2022-03-24 | End: 2022-09-27

## 2022-09-27 NOTE — HISTORY OF PRESENT ILLNESS
[de-identified] : 66 year old Female with hx of chronic sinusitis presents for follow-up Left deviated septum\par LCV 6/08/22 at which time was started on steroid rinses, Mometasone - no longer using\par s/p CT sinus 9/08/22-- reviewed personally-- severe nasal septal deviation to Left and Left upper molar disease - OMFS with Dr. Mayer appt 10/03/22\par Reports no change in symptoms - continues to have constant nasal congestion and difficulty breathing through nose, chronic mouth breathing, clear watery rhinorrhea as of yesterday, intermittent PND, constant bilateral orbital pressure\par States positive COVID-19 2 weeks ago - no residual symptoms\par Denies poor sense of smell or epistaxis\par No recent sinus infections

## 2022-10-20 NOTE — H&P PST ADULT - PAIN CHRONIC, DURATION OF, PROFILE
Nerve study has been ordered for the right leg - if you do not hear to schedule, please call Central Scheduling at [phone].    Schedule COVID-19 booster [phone]   2yrs

## 2022-11-02 ENCOUNTER — OFFICE (OUTPATIENT)
Dept: URBAN - METROPOLITAN AREA CLINIC 35 | Facility: CLINIC | Age: 67
Setting detail: OPHTHALMOLOGY
End: 2022-11-02
Payer: COMMERCIAL

## 2022-11-02 DIAGNOSIS — H25.13: ICD-10-CM

## 2022-11-02 DIAGNOSIS — H52.13: ICD-10-CM

## 2022-11-02 DIAGNOSIS — H52.223: ICD-10-CM

## 2022-11-02 DIAGNOSIS — C43.9: ICD-10-CM

## 2022-11-02 DIAGNOSIS — H40.053: ICD-10-CM

## 2022-11-02 PROCEDURE — 99213 OFFICE O/P EST LOW 20 MIN: CPT | Performed by: OPHTHALMOLOGY

## 2022-11-02 PROCEDURE — 92015 DETERMINE REFRACTIVE STATE: CPT | Performed by: OPHTHALMOLOGY

## 2022-11-02 ASSESSMENT — REFRACTION_CURRENTRX
OD_OVR_VA: 20/
OS_AXIS: 075
OS_CYLINDER: +0.75
OS_OVR_VA: 20/
OD_VPRISM_DIRECTION: SV
OD_SPHERE: -2.75
OD_CYLINDER: +0.50
OS_VPRISM_DIRECTION: SV
OD_SPHERE: -3.00
OD_OVR_VA: 20/
OD_SPHERE: -3.25
OS_SPHERE: -3.00
OS_OVR_VA: 20/
OD_AXIS: 085
OD_CYLINDER: -0.50
OD_AXIS: 000
OD_VPRISM_DIRECTION: SV
OS_OVR_VA: 20/
OD_CYLINDER: -0.75
OS_AXIS: 152
OS_AXIS: 159
OD_AXIS: 177
OS_SPHERE: -2.50
OS_SPHERE: -3.00
OS_CYLINDER: -0.75
OD_OVR_VA: 20/
OS_CYLINDER: -0.75
OS_VPRISM_DIRECTION: SV

## 2022-11-02 ASSESSMENT — REFRACTION_MANIFEST
OS_CYLINDER: -0.75
OS_SPHERE: -2.50
OS_AXIS: 170
OS_CYLINDER: -1.00
OS_VA1: 20/20-1
OD_SPHERE: -3.00
OD_CYLINDER: -0.50
OS_SPHERE: -2.25
OS_AXIS: 165
OD_VA1: 20/20-1
OS_CYLINDER: -0.50
OS_VA1: 20/20
OD_SPHERE: -2.75
OD_VA1: 20/25
OD_CYLINDER: -0.25
OD_VA1: 20/20-1
OD_VA1: 20/25-1
OD_AXIS: 175
OD_SPHERE: -2.50
OS_CYLINDER: -0.50
OS_SPHERE: -2.25
OS_AXIS: 155
OS_AXIS: 160
OS_SPHERE: -2.50
OS_CYLINDER: -0.75
OS_CYLINDER: -1.00
OD_CYLINDER: -0.50
OD_SPHERE: -3.00
OD_SPHERE: -2.75
OS_VA1: 20/20
OD_SPHERE: -2.75
OD_AXIS: 175
OD_AXIS: 165
OD_AXIS: 170
OD_VA1: 20/25-
OD_AXIS: 138
OD_VA1: 20/20
OS_SPHERE: -2.50
OS_SPHERE: -2.50
OD_CYLINDER: -0.50
OS_AXIS: 157
OD_AXIS: 170
OD_CYLINDER: -0.50
OD_CYLINDER: -0.50
OS_VA1: 20/20
OS_AXIS: 165
OS_VA1: 20/20

## 2022-11-02 ASSESSMENT — REFRACTION_AUTOREFRACTION
OS_SPHERE: -2.25
OS_AXIS: 159
OD_SPHERE: -2.50
OD_CYLINDER: -0.50
OD_AXIS: 171
OS_CYLINDER: -1.00

## 2022-11-02 ASSESSMENT — SPHEQUIV_DERIVED
OD_SPHEQUIV: -2.625
OS_SPHEQUIV: -2.875
OS_SPHEQUIV: -2.75
OD_SPHEQUIV: -3.25
OS_SPHEQUIV: -3
OS_SPHEQUIV: -2.625
OD_SPHEQUIV: -3
OD_SPHEQUIV: -3.25
OD_SPHEQUIV: -3
OD_SPHEQUIV: -2.75
OD_SPHEQUIV: -3
OS_SPHEQUIV: -2.75

## 2022-11-02 ASSESSMENT — AXIALLENGTH_DERIVED
OS_AL: 24.269
OS_AL: 24.269
OD_AL: 24.5761
OS_AL: 24.3725
OS_AL: 24.269
OD_AL: 24.5761
OD_AL: 24.4709
OS_AL: 24.2176
OD_AL: 24.3147
OD_AL: 24.4709
OS_AL: 24.3206
OS_AL: 24.269
OD_AL: 24.3665
OD_AL: 24.4709

## 2022-11-02 ASSESSMENT — PACHYMETRY
OD_CT_UM: 596
OD_CT_CORRECTION: -4
OS_CT_UM: 597
OS_CT_CORRECTION: -4

## 2022-11-02 ASSESSMENT — TONOMETRY
OS_IOP_MMHG: 19
OD_IOP_MMHG: 19

## 2022-11-02 ASSESSMENT — KERATOMETRY
OS_K2POWER_DIOPTERS: 45.00
OD_AXISANGLE_DEGREES: 073
OD_K2POWER_DIOPTERS: 45.00
OS_AXISANGLE_DEGREES: 103
METHOD_AUTO_MANUAL: AUTO
OS_K1POWER_DIOPTERS: 44.25
OD_K1POWER_DIOPTERS: 43.75

## 2022-11-02 ASSESSMENT — VISUAL ACUITY
OD_BCVA: 20/25-1
OS_BCVA: 20/25-1

## 2022-11-09 ENCOUNTER — APPOINTMENT (OUTPATIENT)
Dept: FAMILY MEDICINE | Facility: CLINIC | Age: 67
End: 2022-11-09

## 2022-11-09 ENCOUNTER — APPOINTMENT (OUTPATIENT)
Dept: DERMATOLOGY | Facility: CLINIC | Age: 67
End: 2022-11-09

## 2022-11-09 DIAGNOSIS — L57.0 ACTINIC KERATOSIS: ICD-10-CM

## 2022-11-09 PROCEDURE — 99214 OFFICE O/P EST MOD 30 MIN: CPT | Mod: 25

## 2022-11-09 PROCEDURE — 17003 DESTRUCT PREMALG LES 2-14: CPT | Mod: 59

## 2022-11-09 PROCEDURE — 17000 DESTRUCT PREMALG LESION: CPT | Mod: 59

## 2022-11-17 ENCOUNTER — NON-APPOINTMENT (OUTPATIENT)
Age: 67
End: 2022-11-17
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
## 2023-01-24 ENCOUNTER — APPOINTMENT (OUTPATIENT)
Dept: CT IMAGING | Facility: IMAGING CENTER | Age: 68
End: 2023-01-24
Payer: MEDICARE

## 2023-01-24 ENCOUNTER — RESULT REVIEW (OUTPATIENT)
Age: 68
End: 2023-01-24

## 2023-01-24 ENCOUNTER — OUTPATIENT (OUTPATIENT)
Dept: OUTPATIENT SERVICES | Facility: HOSPITAL | Age: 68
LOS: 1 days | End: 2023-01-24
Payer: MEDICARE

## 2023-01-24 DIAGNOSIS — Z98.890 OTHER SPECIFIED POSTPROCEDURAL STATES: Chronic | ICD-10-CM

## 2023-01-24 DIAGNOSIS — Z98.891 HISTORY OF UTERINE SCAR FROM PREVIOUS SURGERY: Chronic | ICD-10-CM

## 2023-01-24 DIAGNOSIS — Z98.1 ARTHRODESIS STATUS: Chronic | ICD-10-CM

## 2023-01-24 DIAGNOSIS — R68.89 OTHER GENERAL SYMPTOMS AND SIGNS: ICD-10-CM

## 2023-01-24 PROCEDURE — 70486 CT MAXILLOFACIAL W/O DYE: CPT | Mod: MH

## 2023-01-24 PROCEDURE — 70486 CT MAXILLOFACIAL W/O DYE: CPT | Mod: 26,MH

## 2023-02-22 ENCOUNTER — APPOINTMENT (OUTPATIENT)
Dept: OTOLARYNGOLOGY | Facility: CLINIC | Age: 68
End: 2023-02-22
Payer: MEDICARE

## 2023-02-22 VITALS
HEART RATE: 77 BPM | HEIGHT: 67.5 IN | SYSTOLIC BLOOD PRESSURE: 124 MMHG | BODY MASS INDEX: 28.7 KG/M2 | WEIGHT: 185 LBS | DIASTOLIC BLOOD PRESSURE: 73 MMHG

## 2023-02-22 DIAGNOSIS — J32.9 CHRONIC SINUSITIS, UNSPECIFIED: ICD-10-CM

## 2023-02-22 PROCEDURE — 99214 OFFICE O/P EST MOD 30 MIN: CPT | Mod: 25

## 2023-02-22 PROCEDURE — 31231 NASAL ENDOSCOPY DX: CPT

## 2023-02-22 NOTE — HISTORY OF PRESENT ILLNESS
[de-identified] : 67 year old female hx left odontogenic sinusitis presents for follow up\par LCV 09/27/22\par Seen by Dr Mayer on 10/3- tooth removed-- did great for 2wk before return of sx\par Return of sx now including severe congestion, maxillary pain/pressure, PND\par Yellow/green discharge when blowing nose, intermittent\par Denies epistaxis, fevers or hospitalizations. \par No longer sprays or saline rinses. \par \par CT from 9/2022 and 1/2023 both reviewed and compared-- interval extraction of L maxillary molar, stable sinus disease, severe L DNS

## 2023-02-22 NOTE — PHYSICAL EXAM
[Nasal Endoscopy Performed] : nasal endoscopy was performed, see procedure section for findings [] : septum deviated to the left [Midline] : trachea located in midline position [de-identified] : L maxillary extraction site well-healed [Normal] : no rashes

## 2023-02-23 ENCOUNTER — OFFICE (OUTPATIENT)
Dept: URBAN - METROPOLITAN AREA CLINIC 35 | Facility: CLINIC | Age: 68
Setting detail: OPHTHALMOLOGY
End: 2023-02-23
Payer: MEDICARE

## 2023-02-23 DIAGNOSIS — H40.053: ICD-10-CM

## 2023-02-23 DIAGNOSIS — C43.9: ICD-10-CM

## 2023-02-23 PROCEDURE — 99213 OFFICE O/P EST LOW 20 MIN: CPT | Performed by: OPHTHALMOLOGY

## 2023-02-23 PROCEDURE — 92250 FUNDUS PHOTOGRAPHY W/I&R: CPT | Performed by: OPHTHALMOLOGY

## 2023-02-23 PROCEDURE — 92083 EXTENDED VISUAL FIELD XM: CPT | Performed by: OPHTHALMOLOGY

## 2023-02-23 PROCEDURE — 92133 CPTRZD OPH DX IMG PST SGM ON: CPT | Performed by: OPHTHALMOLOGY

## 2023-02-23 ASSESSMENT — REFRACTION_CURRENTRX
OD_OVR_VA: 20/
OD_CYLINDER: +0.50
OS_CYLINDER: +0.75
OD_OVR_VA: 20/
OS_VPRISM_DIRECTION: SV
OS_OVR_VA: 20/
OS_VPRISM_DIRECTION: SV
OD_AXIS: 000
OS_OVR_VA: 20/
OS_AXIS: 159
OD_AXIS: 085
OS_AXIS: 075
OD_SPHERE: -3.25
OD_VPRISM_DIRECTION: SV
OD_SPHERE: -2.75
OD_VPRISM_DIRECTION: SV
OD_CYLINDER: -0.25
OS_SPHERE: -3.00
OS_SPHERE: -2.50
OS_SPHERE: -3.00
OD_CYLINDER: -0.50
OD_SPHERE: -3.25
OS_CYLINDER: -1.00
OS_CYLINDER: -0.75
OD_OVR_VA: 20/
OS_OVR_VA: 20/
OS_AXIS: 152
OD_AXIS: 003

## 2023-02-23 ASSESSMENT — SPHEQUIV_DERIVED
OD_SPHEQUIV: -3
OD_SPHEQUIV: -3.25
OD_SPHEQUIV: -3.25
OD_SPHEQUIV: -2.625
OD_SPHEQUIV: -3
OD_SPHEQUIV: -3
OS_SPHEQUIV: -2.75
OS_SPHEQUIV: -2.5
OD_SPHEQUIV: -2.875
OS_SPHEQUIV: -2.875
OS_SPHEQUIV: -2.75
OS_SPHEQUIV: -2.75
OS_SPHEQUIV: -3
OS_SPHEQUIV: -2.75
OD_SPHEQUIV: -3
OS_SPHEQUIV: -2.625

## 2023-02-23 ASSESSMENT — KERATOMETRY
OS_AXISANGLE_DEGREES: 107
OD_K1POWER_DIOPTERS: 44.00
METHOD_AUTO_MANUAL: AUTO
OS_K2POWER_DIOPTERS: 45.00
OD_AXISANGLE_DEGREES: 071
OS_K1POWER_DIOPTERS: 44.25
OD_K2POWER_DIOPTERS: 45.00

## 2023-02-23 ASSESSMENT — CONFRONTATIONAL VISUAL FIELD TEST (CVF)
OS_FINDINGS: FULL
OD_FINDINGS: FULL

## 2023-02-23 ASSESSMENT — REFRACTION_MANIFEST
OD_CYLINDER: -0.50
OD_AXIS: 170
OD_AXIS: 175
OS_SPHERE: -2.50
OS_SPHERE: -2.25
OS_AXIS: 165
OS_AXIS: 170
OD_VA1: 20/25-1
OD_SPHERE: -2.50
OS_AXIS: 157
OS_CYLINDER: -0.50
OD_VA1: 20/20-1
OD_SPHERE: -2.75
OD_VA1: 20/25
OD_CYLINDER: -0.50
OS_VA1: 20/20
OD_SPHERE: -2.75
OS_VA1: 20/20
OD_SPHERE: -3.00
OS_CYLINDER: -0.75
OS_AXIS: 160
OD_SPHERE: -2.75
OD_AXIS: 170
OS_SPHERE: -2.25
OD_AXIS: 077
OS_VA1: 20/20-3
OS_VA1: 20/20
OS_SPHERE: -3.00
OS_AXIS: 165
OD_VA1: 20/20-1
OD_AXIS: 165
OD_VA1: 20/25+2
OS_VA1: 20/20-1
OD_AXIS: 175
OS_VA1: 20/20
OD_VA1: 20/25-
OS_SPHERE: -2.50
OD_CYLINDER: -0.25
OD_VA1: 20/20
OS_CYLINDER: -1.00
OS_AXIS: 155
OS_CYLINDER: -1.00
OS_CYLINDER: +0.50
OD_AXIS: 138
OD_SPHERE: -3.25
OS_SPHERE: -2.50
OS_CYLINDER: -0.75
OD_CYLINDER: -0.50
OD_CYLINDER: +0.50
OS_CYLINDER: -0.50
OS_SPHERE: -2.50
OD_SPHERE: -3.00
OS_AXIS: 066

## 2023-02-23 ASSESSMENT — AXIALLENGTH_DERIVED
OS_AL: 24.1663
OD_AL: 24.4216
OS_AL: 24.269
OD_AL: 24.3695
OS_AL: 24.269
OS_AL: 24.3206
OD_AL: 24.4216
OD_AL: 24.266
OS_AL: 24.3725
OD_AL: 24.5264
OD_AL: 24.4216
OD_AL: 24.5264
OS_AL: 24.2176
OS_AL: 24.269
OD_AL: 24.4216
OS_AL: 24.269

## 2023-02-23 ASSESSMENT — REFRACTION_AUTOREFRACTION
OD_CYLINDER: -0.25
OS_AXIS: 156
OS_SPHERE: -2.25
OS_CYLINDER: -0.50
OD_SPHERE: -2.75
OD_AXIS: 167

## 2023-02-23 ASSESSMENT — VISUAL ACUITY
OS_BCVA: 20/25-1
OD_BCVA: 20/25-1

## 2023-02-27 RX ORDER — SULFAMETHOXAZOLE AND TRIMETHOPRIM 800; 160 MG/1; MG/1
800-160 TABLET ORAL TWICE DAILY
Qty: 20 | Refills: 0 | Status: DISCONTINUED | COMMUNITY
Start: 2023-02-22 | End: 2023-02-27

## 2023-03-19 RX ORDER — LOSARTAN POTASSIUM 100 MG/1
100 TABLET, FILM COATED ORAL
Qty: 1 | Refills: 3 | Status: ACTIVE | COMMUNITY
Start: 1900-01-01 | End: 1900-01-01

## 2023-03-22 ENCOUNTER — APPOINTMENT (OUTPATIENT)
Dept: OTOLARYNGOLOGY | Facility: CLINIC | Age: 68
End: 2023-03-22
Payer: MEDICARE

## 2023-03-22 VITALS
DIASTOLIC BLOOD PRESSURE: 72 MMHG | HEIGHT: 67.5 IN | SYSTOLIC BLOOD PRESSURE: 109 MMHG | BODY MASS INDEX: 28.7 KG/M2 | HEART RATE: 83 BPM | WEIGHT: 185 LBS

## 2023-03-22 DIAGNOSIS — K21.9 GASTRO-ESOPHAGEAL REFLUX DISEASE W/OUT ESOPHAGITIS: ICD-10-CM

## 2023-03-22 DIAGNOSIS — J01.90 ACUTE SINUSITIS, UNSPECIFIED: ICD-10-CM

## 2023-03-22 PROCEDURE — 31231 NASAL ENDOSCOPY DX: CPT

## 2023-03-22 PROCEDURE — 99213 OFFICE O/P EST LOW 20 MIN: CPT | Mod: 25

## 2023-03-22 RX ORDER — DOXYCYCLINE 100 MG/1
100 CAPSULE ORAL
Qty: 14 | Refills: 0 | Status: DISCONTINUED | COMMUNITY
Start: 2023-02-27 | End: 2023-03-22

## 2023-03-22 RX ORDER — METHYLPREDNISOLONE 4 MG/1
4 TABLET ORAL
Qty: 1 | Refills: 0 | Status: DISCONTINUED | COMMUNITY
Start: 2023-02-22 | End: 2023-03-22

## 2023-03-22 NOTE — HISTORY OF PRESENT ILLNESS
[de-identified] : 67 year old female hx CRS presents for follow up\par LCV 02/22/23 at which time prescribed Bactrim and Medrol pack \par Completed Bactrim and Medrol with some improvement \par Continues to have congestion, bilateral maxillary pressure, PND- about 75% improved\par  reports loud snoring without witnessed apneas \par No recent anterior rhinorrhea \par Inconsistent use of saline rinses

## 2023-03-27 ENCOUNTER — RX RENEWAL (OUTPATIENT)
Age: 68
End: 2023-03-27

## 2023-04-05 NOTE — ED ADULT TRIAGE NOTE - BP NONINVASIVE SYSTOLIC (MM HG)
088 Propranolol Counseling:  I discussed with the patient the risks of propranolol including but not limited to low heart rate, low blood pressure, low blood sugar, restlessness and increased cold sensitivity. They should call the office if they experience any of these side effects.

## 2023-04-20 ENCOUNTER — APPOINTMENT (OUTPATIENT)
Dept: FAMILY MEDICINE | Facility: CLINIC | Age: 68
End: 2023-04-20

## 2023-05-10 ENCOUNTER — APPOINTMENT (OUTPATIENT)
Dept: DERMATOLOGY | Facility: CLINIC | Age: 68
End: 2023-05-10
Payer: MEDICARE

## 2023-05-10 DIAGNOSIS — D23.9 OTHER BENIGN NEOPLASM OF SKIN, UNSPECIFIED: ICD-10-CM

## 2023-05-10 PROCEDURE — 99213 OFFICE O/P EST LOW 20 MIN: CPT

## 2023-05-10 NOTE — PHYSICAL EXAM
[Alert] : alert [Oriented x 3] : ~L oriented x 3 [Well Nourished] : well nourished [Conjunctiva Non-injected] : conjunctiva non-injected [No Visual Lymphadenopathy] : no visual  lymphadenopathy [No Clubbing] : no clubbing [No Edema] : no edema [No Bromhidrosis] : no bromhidrosis [No Chromhidrosis] : no chromhidrosis [Full Body Skin Exam Performed] : performed [FreeTextEntry3] : General: Alert and oriented, in NAD. \par All of the following were examined and were within normal limits, except as noted: \par Scalp: \par Face: \par Neck: \par Chest/Back/Abdomen:  \par Arms/Hands: \par Legs/Feet: \par Buttocks, Genitalia: \par Hair, Nails, Oral Mucosa, Eyes: \par Lymph Nodes:\par \par - Fairly uniform and regular brown macules and pink-brown papules on the trunk and extremities. \par - Scattered, brown, stuck-on papules including on left areola \par - Firm pigmented papules R calf and L posterior thigh\par - Well healed linear surgical scar on L mid-back w/o evidence of repigmentation or nodularity. Just adjacent to the middle of scar is a brown homogenous macule\par - blue ink macule at inferior edge of scar on upper back\par - No axillary, cervical, supraclavicular, inguinal LAD \par - several toenails R>>L with subungual debris and nail thickening\par

## 2023-05-10 NOTE — HISTORY OF PRESENT ILLNESS
[FreeTextEntry1] : spots, hx of melanoma, nail fungus [de-identified] : Ms. SOHAM MCNEAL is a 67 year old female who presents for f/u as below. Last seen 11/2022 for FBSE\par \par #Problem: Melanoma ([id number]. 1.7 mm depth)\par Location: L mid back\par Duration: September 2019\par Associated symptoms/Aggravating factors: None\par Modifying factors/Treatments: S/p WLE and negative sentinel node biopsy 10/2019.\par - No recurrence since surgery. No fever, chills, weight loss or night sweats. Follows regularly with gyn, ophthalmologist, and dentist. Notes using latanoprost eye drops for "pressure in eyes" (stable per patient)\par \par #Toenail fungus, progressing on R foot. xyears\par - 5/2023: slight improvement with ciclopirox solution BID since LV Nov 2022\par \par #FBSE.  Spots scattered on body x years. Asymptomatic and unchanged. No alleviating/aggravating factors. Never been treated.\par No other changing or concerning lesions. \par No itchy, growing, bleeding, painful or changing moles. 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

## 2023-05-10 NOTE — ASSESSMENT
[FreeTextEntry1] : 1) Seborrheic keratosis\par - Dx and course of condition reviewed\par - Pt reassured; benign\par \par 2) History of melanoma (Nodular melanoma, [id number]), left mid-back, S/p WLE and negative SLNB in 10/2019\par - No evidence of recurrence or repigmentation, no LAD. \par There is a homogenous brown macule adjacent to middle of the scar; will clinically monitor with photos for now. \par - Denies fevers, chills, weight loss\par - Recommend to f/u with ophthalmology, dentist, and OB-Gyn annually\par Requested patient to have these providers send us office notes \par - Discussed mole monitoring and sun protective measures\par - Q6 months FBSE\par \par 3) Multiple melanocytic nevi, benign \par - Monitor moles for changes\par - Recommend wearing hats and sun protective clothing or OTC sunscreen products (SPF 30+, broad band) daily on your face and entire body (apply sunscreen at least 30 minutes prior to going outside). Reapply sunscreen every 2 hours when outside.\par \par 4) Dermatofibroma\par - Benign diagnosis discussed - it is a ball of scar tissue underneath the skin in response to prior injury or trauma (bug bite)\par - No further treatment needed at this time\par - Advised patient against surgical removal given risk of poor wound healing with this type of lesion\par \par 5) Screening exam for skin cancer\par Total body skin exam performed.\par - Recommended monthly self skin exams \par - Recommended sun protection with SPF 30 or greater labeled broad spectrum, long sleeves and pants, as well as a hat and sunglasses \par - Discussed the ABCDEs of melanoma\par RTC 6 months\par \par 6) Onychomycosis, toenails R >>L - chronic; stable. Slight improvement since LV Nov 2022\par - Diagnosis and treatment options discussed\par - Continue ciclopirox 8% solution BID to affected nails; proper use discussed

## 2023-06-06 ENCOUNTER — APPOINTMENT (OUTPATIENT)
Dept: OTOLARYNGOLOGY | Facility: CLINIC | Age: 68
End: 2023-06-06

## 2023-06-14 ENCOUNTER — OFFICE (OUTPATIENT)
Dept: URBAN - METROPOLITAN AREA CLINIC 35 | Facility: CLINIC | Age: 68
Setting detail: OPHTHALMOLOGY
End: 2023-06-14
Payer: MEDICARE

## 2023-06-14 DIAGNOSIS — H25.13: ICD-10-CM

## 2023-06-14 DIAGNOSIS — H40.053: ICD-10-CM

## 2023-06-14 PROCEDURE — 99213 OFFICE O/P EST LOW 20 MIN: CPT | Performed by: OPHTHALMOLOGY

## 2023-06-14 ASSESSMENT — REFRACTION_MANIFEST
OS_VA1: 20/20
OS_SPHERE: -3.00
OS_SPHERE: -2.50
OD_SPHERE: -3.00
OS_SPHERE: -2.50
OS_SPHERE: -2.25
OD_CYLINDER: -0.50
OS_CYLINDER: -1.00
OS_VA1: 20/20
OS_AXIS: 066
OS_CYLINDER: -0.75
OD_SPHERE: -2.75
OD_VA1: 20/25-1
OD_CYLINDER: -0.50
OD_SPHERE: -2.50
OD_CYLINDER: -0.50
OS_AXIS: 165
OS_VA1: 20/20
OS_AXIS: 170
OS_VA1: 20/20
OD_VA1: 20/25-
OS_AXIS: 155
OD_AXIS: 170
OD_AXIS: 175
OD_VA1: 20/25
OS_SPHERE: -2.50
OS_CYLINDER: -1.00
OS_CYLINDER: +0.50
OD_AXIS: 077
OD_SPHERE: -2.75
OS_AXIS: 165
OD_CYLINDER: -0.50
OS_CYLINDER: -0.50
OD_SPHERE: -3.25
OD_VA1: 20/20-1
OS_VA1: 20/20-1
OD_AXIS: 165
OS_SPHERE: -2.25
OD_AXIS: 170
OD_CYLINDER: -0.50
OD_SPHERE: -3.00
OS_VA1: 20/20-3
OD_CYLINDER: +0.50
OS_AXIS: 157
OD_AXIS: 138
OD_CYLINDER: -0.25
OS_SPHERE: -2.50
OD_VA1: 20/20
OD_SPHERE: -2.75
OD_VA1: 20/20-1
OS_CYLINDER: -0.50
OS_CYLINDER: -0.75
OD_VA1: 20/25+2
OS_AXIS: 160
OD_AXIS: 175

## 2023-06-14 ASSESSMENT — SPHEQUIV_DERIVED
OD_SPHEQUIV: -2.625
OD_SPHEQUIV: -3.25
OS_SPHEQUIV: -2.75
OS_SPHEQUIV: -2.875
OS_SPHEQUIV: -2.75
OD_SPHEQUIV: -3
OS_SPHEQUIV: -2.75
OS_SPHEQUIV: -3
OD_SPHEQUIV: -3
OS_SPHEQUIV: -2.75
OS_SPHEQUIV: -2.625
OD_SPHEQUIV: -3
OD_SPHEQUIV: -3.25
OS_SPHEQUIV: -2.625

## 2023-06-14 ASSESSMENT — REFRACTION_AUTOREFRACTION
OS_CYLINDER: -0.75
OS_AXIS: 158
OD_SPHERE: -3.00
OD_AXIS: 000
OS_SPHERE: -2.25
OD_CYLINDER: 0.00

## 2023-06-14 ASSESSMENT — REFRACTION_CURRENTRX
OS_SPHERE: -3.00
OD_OVR_VA: 20/
OS_SPHERE: -2.50
OD_AXIS: 085
OD_CYLINDER: -0.25
OD_OVR_VA: 20/
OS_OVR_VA: 20/
OS_OVR_VA: 20/
OS_VPRISM_DIRECTION: SV
OS_AXIS: 075
OD_AXIS: 000
OD_OVR_VA: 20/
OD_CYLINDER: -0.50
OS_AXIS: 152
OS_VPRISM_DIRECTION: SV
OS_CYLINDER: -0.75
OS_CYLINDER: +0.75
OS_AXIS: 159
OD_AXIS: 003
OD_SPHERE: -2.75
OD_SPHERE: -3.25
OD_VPRISM_DIRECTION: SV
OS_CYLINDER: -1.00
OD_CYLINDER: +0.50
OD_SPHERE: -3.25
OD_VPRISM_DIRECTION: SV
OS_OVR_VA: 20/
OS_SPHERE: -3.00

## 2023-06-14 ASSESSMENT — AXIALLENGTH_DERIVED
OS_AL: 24.2176
OS_AL: 24.269
OS_AL: 24.269
OS_AL: 24.3725
OD_AL: 24.4216
OD_AL: 24.4216
OS_AL: 24.269
OD_AL: 24.5264
OD_AL: 24.4216
OD_AL: 24.4216
OD_AL: 24.5264
OD_AL: 24.4216
OS_AL: 24.269
OS_AL: 24.2176
OS_AL: 24.3206
OD_AL: 24.266

## 2023-06-14 ASSESSMENT — TONOMETRY
OD_IOP_MMHG: 20
OS_IOP_MMHG: 20

## 2023-06-14 ASSESSMENT — KERATOMETRY
OS_K2POWER_DIOPTERS: 45.00
OS_AXISANGLE_DEGREES: 105
OS_K1POWER_DIOPTERS: 44.25
OD_K1POWER_DIOPTERS: 44.00
OD_K2POWER_DIOPTERS: 45.00
OD_AXISANGLE_DEGREES: 067
METHOD_AUTO_MANUAL: AUTO

## 2023-06-14 ASSESSMENT — PACHYMETRY
OD_CT_CORRECTION: -4
OD_CT_UM: 596
OS_CT_CORRECTION: -4
OS_CT_UM: 597

## 2023-06-14 ASSESSMENT — VISUAL ACUITY
OS_BCVA: 20/20
OD_BCVA: 20/20-1

## 2023-06-14 ASSESSMENT — CONFRONTATIONAL VISUAL FIELD TEST (CVF)
OD_FINDINGS: FULL
OS_FINDINGS: FULL

## 2023-06-23 ENCOUNTER — RX RENEWAL (OUTPATIENT)
Age: 68
End: 2023-06-23

## 2023-08-02 NOTE — ASU PATIENT PROFILE, ADULT - PMH
Hypertension    Malignant melanoma in situ    Obesity Mastoid Interpolation Flap Text: A decision was made to reconstruct the defect utilizing an interpolation axial flap and a staged reconstruction.  A telfa template was made of the defect.  This telfa template was then used to outline the mastoid interpolation flap.  The donor area for the pedicle flap was then injected with anesthesia.  The flap was excised through the skin and subcutaneous tissue down to the layer of the underlying musculature.  The pedicle flap was carefully excised within this deep plane to maintain its blood supply.  The edges of the donor site were undermined.   The donor site was closed in a primary fashion.  The pedicle was then rotated into position and sutured.  Once the tube was sutured into place, adequate blood supply was confirmed with blanching and refill.  The pedicle was then wrapped with xeroform gauze and dressed appropriately with a telfa and gauze bandage to ensure continued blood supply and protect the attached pedicle.

## 2023-09-18 ENCOUNTER — RX RENEWAL (OUTPATIENT)
Age: 68
End: 2023-09-18

## 2023-10-03 ENCOUNTER — APPOINTMENT (OUTPATIENT)
Dept: DERMATOLOGY | Facility: CLINIC | Age: 68
End: 2023-10-03
Payer: MEDICARE

## 2023-10-03 DIAGNOSIS — L91.8 OTHER HYPERTROPHIC DISORDERS OF THE SKIN: ICD-10-CM

## 2023-10-03 DIAGNOSIS — D48.5 NEOPLASM OF UNCERTAIN BEHAVIOR OF SKIN: ICD-10-CM

## 2023-10-03 DIAGNOSIS — D22.9 MELANOCYTIC NEVI, UNSPECIFIED: ICD-10-CM

## 2023-10-03 PROCEDURE — 11102 TANGNTL BX SKIN SINGLE LES: CPT

## 2023-10-03 PROCEDURE — 99213 OFFICE O/P EST LOW 20 MIN: CPT | Mod: 25

## 2023-10-16 ENCOUNTER — NON-APPOINTMENT (OUTPATIENT)
Age: 68
End: 2023-10-16

## 2023-11-15 ENCOUNTER — APPOINTMENT (OUTPATIENT)
Dept: DERMATOLOGY | Facility: CLINIC | Age: 68
End: 2023-11-15
Payer: MEDICARE

## 2023-11-15 PROCEDURE — 99214 OFFICE O/P EST MOD 30 MIN: CPT

## 2023-11-17 LAB — DERMATOLOGY BIOPSY: NORMAL

## 2023-12-11 ENCOUNTER — OFFICE (OUTPATIENT)
Dept: URBAN - METROPOLITAN AREA CLINIC 35 | Facility: CLINIC | Age: 68
Setting detail: OPHTHALMOLOGY
End: 2023-12-11
Payer: MEDICARE

## 2023-12-11 DIAGNOSIS — H25.13: ICD-10-CM

## 2023-12-11 DIAGNOSIS — H40.053: ICD-10-CM

## 2023-12-11 DIAGNOSIS — H02.834: ICD-10-CM

## 2023-12-11 DIAGNOSIS — H02.831: ICD-10-CM

## 2023-12-11 PROCEDURE — 99213 OFFICE O/P EST LOW 20 MIN: CPT | Performed by: OPHTHALMOLOGY

## 2023-12-11 ASSESSMENT — REFRACTION_CURRENTRX
OS_SPHERE: -3.00
OD_CYLINDER: +0.50
OS_VPRISM_DIRECTION: SV
OD_VPRISM_DIRECTION: SV
OD_OVR_VA: 20/
OD_AXIS: 085
OD_CYLINDER: -0.50
OS_AXIS: 075
OD_SPHERE: -2.75
OD_SPHERE: -3.25
OS_OVR_VA: 20/
OD_AXIS: 178
OS_AXIS: 160
OD_CYLINDER: -0.50
OS_VPRISM_DIRECTION: SV
OS_AXIS: 152
OD_VPRISM_DIRECTION: SV
OS_OVR_VA: 20/
OS_SPHERE: -2.50
OS_CYLINDER: +0.75
OD_OVR_VA: 20/
OD_OVR_VA: 20/
OS_CYLINDER: -0.75
OS_SPHERE: -2.50
OD_SPHERE: -2.75
OS_OVR_VA: 20/
OS_CYLINDER: -0.50
OD_AXIS: 000

## 2023-12-11 ASSESSMENT — REFRACTION_MANIFEST
OS_AXIS: 165
OS_SPHERE: -2.50
OS_VA1: 20/20
OD_AXIS: 170
OS_AXIS: 170
OD_VA1: 20/20-1
OD_AXIS: 165
OS_SPHERE: -2.25
OS_AXIS: 155
OD_CYLINDER: -0.50
OD_SPHERE: -2.75
OD_VA1: 20/25-
OS_SPHERE: -2.50
OD_AXIS: 170
OD_CYLINDER: -0.50
OS_CYLINDER: -1.00
OD_VA1: 20/20
OD_VA1: 20/25-1
OS_SPHERE: -3.00
OS_VA1: 20/20
OS_VA1: 20/20-1
OS_SPHERE: -2.25
OS_CYLINDER: -0.50
OD_AXIS: 077
OD_SPHERE: -2.75
OD_VA1: 20/25
OD_VA1: 20/20-1
OS_CYLINDER: +0.50
OS_CYLINDER: -0.75
OD_SPHERE: -2.75
OD_CYLINDER: -0.50
OS_AXIS: 165
OD_CYLINDER: +0.50
OD_AXIS: 175
OS_AXIS: 066
OS_CYLINDER: -0.50
OS_CYLINDER: -1.00
OD_SPHERE: -3.00
OD_SPHERE: -3.00
OD_VA1: 20/25+2
OD_CYLINDER: -0.25
OS_VA1: 20/20-3
OD_SPHERE: -2.50
OD_SPHERE: -3.25
OD_AXIS: 138
OS_VA1: 20/20
OS_AXIS: 157
OS_SPHERE: -2.50
OS_SPHERE: -2.50
OD_CYLINDER: -0.50
OS_AXIS: 160
OD_CYLINDER: -0.50
OS_VA1: 20/20
OD_AXIS: 175
OS_CYLINDER: -0.75

## 2023-12-11 ASSESSMENT — SPHEQUIV_DERIVED
OD_SPHEQUIV: -2.75
OS_SPHEQUIV: -2.75
OD_SPHEQUIV: -3.25
OS_SPHEQUIV: -2.5
OD_SPHEQUIV: -3
OS_SPHEQUIV: -3
OD_SPHEQUIV: -3.25
OS_SPHEQUIV: -2.75
OD_SPHEQUIV: -3
OD_SPHEQUIV: -3
OS_SPHEQUIV: -2.875
OS_SPHEQUIV: -2.75
OS_SPHEQUIV: -2.75
OS_SPHEQUIV: -2.625
OD_SPHEQUIV: -3
OD_SPHEQUIV: -2.625

## 2023-12-11 ASSESSMENT — LID POSITION - DERMATOCHALASIS
OS_DERMATOCHALASIS: LUL 1+
OD_DERMATOCHALASIS: RUL 1+

## 2023-12-11 ASSESSMENT — REFRACTION_AUTOREFRACTION
OD_SPHERE: -2.75
OS_SPHERE: -2.25
OS_CYLINDER: -0.50
OD_AXIS: 000
OS_AXIS: 155
OD_CYLINDER: 0.00

## 2023-12-20 ENCOUNTER — APPOINTMENT (OUTPATIENT)
Dept: DERMATOLOGY | Facility: CLINIC | Age: 68
End: 2023-12-20
Payer: MEDICARE

## 2023-12-20 DIAGNOSIS — C43.59 MALIGNANT MELANOMA OF OTHER PART OF TRUNK: ICD-10-CM

## 2023-12-20 DIAGNOSIS — Z85.828 PERSONAL HISTORY OF OTHER MALIGNANT NEOPLASM OF SKIN: ICD-10-CM

## 2023-12-20 PROCEDURE — 99214 OFFICE O/P EST MOD 30 MIN: CPT

## 2023-12-20 RX ORDER — CICLOPIROX 80 MG/ML
8 SOLUTION TOPICAL
Qty: 1 | Refills: 6 | Status: ACTIVE | COMMUNITY
Start: 2022-11-09 | End: 1900-01-01

## 2023-12-20 NOTE — HISTORY OF PRESENT ILLNESS
[FreeTextEntry1] : spots, hx of melanoma, nail fungus [de-identified] : Ms. SOHAM MCNEAL is a 67 year old female who presents for f/u as below. Last seen 11/2022 for FBSE  #Hx of Melanoma ([id number]. 1.7 mm depth) on L mid back, September 2019, S/p WLE and negative sentinel node biopsy 10/2019. No recurrence since surgery. No fever, chills, weight loss or night sweats.  Follows regularly with gyn, ophthalmologist, and dentist. Notes using latanoprost eye drops for "pressure in eyes" (stable per patient)  #BCC on central forehead, biopsied by Dr. Rouse 10/3/23, pending Mohs 1/2/24  #Toenail fungus, progressing on R foot. xyears - 5/2023: slight improvement with ciclopirox solution BID since LV Nov 2022 - 12/2023: notes she forgets to wipe off solution every week, so maybe application is not effective  #FBSE.  Spots scattered on body x years. Asymptomatic and unchanged. No alleviating/aggravating factors. Never been treated.  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

## 2023-12-20 NOTE — PHYSICAL EXAM
[Alert] : alert [Oriented x 3] : ~L oriented x 3 [Well Nourished] : well nourished [Conjunctiva Non-injected] : conjunctiva non-injected [No Visual Lymphadenopathy] : no visual  lymphadenopathy [No Clubbing] : no clubbing [No Edema] : no edema [No Bromhidrosis] : no bromhidrosis [No Chromhidrosis] : no chromhidrosis [Full Body Skin Exam Performed] : performed [FreeTextEntry3] : General: Alert and oriented, in NAD.  All of the following were examined and were within normal limits, except as noted:  Scalp:  Face:  Neck:  Chest/Back/Abdomen:   Arms/Hands:  Legs/Feet:  Buttocks, Genitalia:  Hair, Nails, Oral Mucosa, Eyes:  Lymph Nodes: - biopsy site healed central forehead - Fairly uniform and regular brown macules and pink-brown papules on the trunk and extremities.  - Scattered, brown, stuck-on papules including on left areola  - Firm pigmented papules R calf and L posterior thigh - Well healed linear surgical scar on L mid-back w/o evidence of repigmentation or nodularity. Just adjacent to the middle of scar is a brown homogenous macule - blue ink macule at inferior edge of scar on upper back - No axillary, cervical, supraclavicular, inguinal LAD  - several toenails R with subungual debris and nail thickening

## 2023-12-20 NOTE — ASSESSMENT
[FreeTextEntry1] : BCC, central forehead - bx 10/3/23 - Pt scheduled for Mohs 1/2/24  History of melanoma - No evidence of recurrence or repigmentation, no LAD - Denies fevers, chills, weight loss - Recommend to f/u with ophthalmology, dentist, and Gynec annually - Discussed mole monitoring and sun protective measures - RTC 6 months FBSE  Multiple melanocytic nevi, benign  - Monitor moles for changes  - Recommend wearing hats and sun protective clothing or OTC sunscreen products (SPF 30+, broad band, EltaMD/La Roche Posay/Neutrogena) daily on your face and entire body (apply sunscreen atleast 30 minutes prior to going outside). Reapply sunscreen every 2 hours when outside.  Seborrheic Keratosis - These growths are benign - Related to genetics - these lesions run in families; NOT related to sun exposure - No treatment warranted unless inflamed; can use OTC Sarna lotion PRN itch  Screening exam for skin cancer - no suspicious lesions on exam today - TBSE performed today - Advised sun protection.  Recommended OTC sunscreen products (EltaMD/Neutrogena/La Roche Posay), including SPF30+ with broadband UV protection as well as proper use.  Discussed OTC sun protective clothing - Counseled patient to monitor for changes, including mole monitoring and self-skin exams  Onychomycosis, R toenails - chronic; flaring - Diagnosis, chronic nature, disease course, treatment options and goals of therapy discussed - Pt defers oral terbinafine for now - Continue ciclopirox solution, increase to BID, to AA. Wipe off with alcohol pad every 7 days

## 2023-12-31 ENCOUNTER — RX RENEWAL (OUTPATIENT)
Age: 68
End: 2023-12-31

## 2023-12-31 RX ORDER — HYDROCHLOROTHIAZIDE 12.5 MG/1
12.5 TABLET ORAL DAILY
Qty: 30 | Refills: 0 | Status: ACTIVE | COMMUNITY
Start: 2020-12-22 | End: 1900-01-01

## 2024-01-01 NOTE — ASSESSMENT
[FreeTextEntry1] :  Claudia Moran MD, [facility]    of Dermatology   Director of Dermatologic Surgery   [facility]

## 2024-01-02 ENCOUNTER — NON-APPOINTMENT (OUTPATIENT)
Age: 69
End: 2024-01-02

## 2024-01-02 ENCOUNTER — APPOINTMENT (OUTPATIENT)
Dept: DERMATOLOGY | Facility: CLINIC | Age: 69
End: 2024-01-02
Payer: MEDICARE

## 2024-01-02 PROCEDURE — 13132 CMPLX RPR F/C/C/M/N/AX/G/H/F: CPT

## 2024-01-02 PROCEDURE — 17311 MOHS 1 STAGE H/N/HF/G: CPT

## 2024-01-02 NOTE — PHYSICAL EXAM
[Alert] : alert [Oriented x 3] : ~L oriented x 3 [Well Nourished] : well nourished [Conjunctiva Non-injected] : conjunctiva non-injected [No Visual Lymphadenopathy] : no visual  lymphadenopathy [No Clubbing] : no clubbing [No Edema] : no edema [No Bromhidrosis] : no bromhidrosis [No Chromhidrosis] : no chromhidrosis [FreeTextEntry3] : Focused body exam performed  -- pink healed biopsy site on left central forehead 0.6x0.6 cm  -- Increased skin redundancy on L lateral upper eyelid - pointed out to patient 11/15/2023

## 2024-01-02 NOTE — HISTORY OF PRESENT ILLNESS
[FreeTextEntry1] : Mohs surgery for nBCC on central forehead  [de-identified] : 11/15/2023   Mohs surgery consultation for nBCC on central forehead  01/02/2023 Mohs surgery for nBCC on central forehead   Referred by: Dr. Rouse  We had the pleasure of seeing your patient in consultation for Mohs Micrographic Surgery.   Ms. SOHAM MCNEAL is a 68 year old F with a history of a T2a melanoma on the back s/p excision and neg SLNBx who presents for Mohs surgery for nBCC on central forehead. She first noticed the spot 1 year ago as a pimple like lesion that comes and goes and occasionally scars. It occasionally bled, though not painful. Denies fever/chills/night sweats.  FH skin cancer: dad with melanoma  SH: Works in real estate. Lives in [place] with . Babysits granddaughter multiple times a week - daughter is a teacher. Her 3rd son is getting engaged this weekend  Upcoming travel plans: not currently   Pertinent positives noted below   History of HIV or hepatitis: No  Blood thinners: no Antibiotic Prophylaxis: None   Medical implants: None   The patient's review of systems questionnaire was reviewed. Education needs were identified. There were no barriers to learning.

## 2024-01-10 ENCOUNTER — APPOINTMENT (OUTPATIENT)
Dept: DERMATOLOGY | Facility: CLINIC | Age: 69
End: 2024-01-10
Payer: MEDICARE

## 2024-01-10 PROCEDURE — 99024 POSTOP FOLLOW-UP VISIT: CPT

## 2024-02-28 ENCOUNTER — OFFICE (OUTPATIENT)
Dept: URBAN - METROPOLITAN AREA CLINIC 35 | Facility: CLINIC | Age: 69
Setting detail: OPHTHALMOLOGY
End: 2024-02-28
Payer: MEDICARE

## 2024-02-28 DIAGNOSIS — H02.834: ICD-10-CM

## 2024-02-28 DIAGNOSIS — H25.13: ICD-10-CM

## 2024-02-28 DIAGNOSIS — H40.053: ICD-10-CM

## 2024-02-28 DIAGNOSIS — H02.831: ICD-10-CM

## 2024-02-28 PROCEDURE — 99213 OFFICE O/P EST LOW 20 MIN: CPT | Performed by: OPHTHALMOLOGY

## 2024-02-28 PROCEDURE — 92083 EXTENDED VISUAL FIELD XM: CPT | Performed by: OPHTHALMOLOGY

## 2024-02-28 PROCEDURE — 92133 CPTRZD OPH DX IMG PST SGM ON: CPT | Performed by: OPHTHALMOLOGY

## 2024-02-28 ASSESSMENT — REFRACTION_MANIFEST
OD_CYLINDER: -0.50
OS_CYLINDER: -0.50
OD_CYLINDER: -0.50
OS_AXIS: 165
OS_SPHERE: -2.25
OD_VA1: 20/25-
OD_AXIS: 165
OD_VA1: 20/20
OS_SPHERE: -2.25
OS_AXIS: 170
OS_SPHERE: -3.00
OS_AXIS: 160
OD_SPHERE: -2.75
OD_CYLINDER: -0.25
OS_SPHERE: -2.50
OS_SPHERE: -2.50
OS_CYLINDER: -0.50
OD_AXIS: 138
OS_VA1: 20/20-1
OD_VA1: 20/20-1
OS_SPHERE: -2.50
OD_AXIS: 175
OS_CYLINDER: -1.00
OD_VA1: 20/25-1
OS_VA1: 20/20
OD_CYLINDER: -0.50
OD_SPHERE: -2.75
OS_VA1: 20/20
OS_CYLINDER: -1.00
OD_AXIS: 170
OD_SPHERE: -3.00
OS_VA1: 20/20-3
OD_SPHERE: -3.25
OD_SPHERE: -2.75
OD_VA1: 20/25
OD_AXIS: 175
OD_CYLINDER: -0.50
OD_VA1: 20/20-1
OD_CYLINDER: +0.50
OS_AXIS: 157
OS_VA1: 20/20
OD_AXIS: 077
OS_AXIS: 165
OS_VA1: 20/20
OD_VA1: 20/25+2
OS_AXIS: 155
OS_CYLINDER: -0.75
OD_CYLINDER: -0.50
OS_CYLINDER: +0.50
OD_SPHERE: -2.50
OS_AXIS: 066
OD_AXIS: 170
OS_CYLINDER: -0.75
OS_SPHERE: -2.50
OD_SPHERE: -3.00

## 2024-02-28 ASSESSMENT — SPHEQUIV_DERIVED
OS_SPHEQUIV: -2.75
OD_SPHEQUIV: -3
OS_SPHEQUIV: -2.625
OD_SPHEQUIV: -3
OS_SPHEQUIV: -2.75
OD_SPHEQUIV: -2.625
OS_SPHEQUIV: -2.75
OD_SPHEQUIV: -3.25
OS_SPHEQUIV: -3
OD_SPHEQUIV: -3
OD_SPHEQUIV: -2.875
OD_SPHEQUIV: -3
OS_SPHEQUIV: -2.875
OD_SPHEQUIV: -3.25
OS_SPHEQUIV: -2.75
OS_SPHEQUIV: -2.625

## 2024-02-28 ASSESSMENT — REFRACTION_CURRENTRX
OD_SPHERE: -2.75
OD_SPHERE: -3.25
OD_CYLINDER: -0.50
OS_SPHERE: -2.50
OS_AXIS: 075
OS_OVR_VA: 20/
OD_AXIS: 000
OS_AXIS: 152
OD_CYLINDER: +0.50
OS_OVR_VA: 20/
OD_CYLINDER: -0.50
OD_OVR_VA: 20/
OS_VPRISM_DIRECTION: SV
OD_AXIS: 085
OD_OVR_VA: 20/
OS_SPHERE: -3.00
OS_CYLINDER: -0.75
OS_OVR_VA: 20/
OS_VPRISM_DIRECTION: SV
OD_VPRISM_DIRECTION: SV
OD_SPHERE: -2.75
OS_CYLINDER: -0.50
OD_OVR_VA: 20/
OD_AXIS: 178
OS_AXIS: 160
OS_CYLINDER: +0.75
OD_VPRISM_DIRECTION: SV
OS_SPHERE: -2.50

## 2024-02-28 ASSESSMENT — LID POSITION - DERMATOCHALASIS
OS_DERMATOCHALASIS: LUL 1+
OD_DERMATOCHALASIS: RUL 1+

## 2024-02-28 ASSESSMENT — CONFRONTATIONAL VISUAL FIELD TEST (CVF)
OS_FINDINGS: FULL
OD_FINDINGS: FULL

## 2024-02-28 ASSESSMENT — REFRACTION_AUTOREFRACTION
OS_CYLINDER: -0.75
OD_AXIS: 146
OD_CYLINDER: -0.25
OS_AXIS: 154
OD_SPHERE: -2.75
OS_SPHERE: -2.25

## 2024-03-27 ENCOUNTER — NON-APPOINTMENT (OUTPATIENT)
Age: 69
End: 2024-03-27

## 2024-06-19 ENCOUNTER — APPOINTMENT (OUTPATIENT)
Dept: DERMATOLOGY | Facility: CLINIC | Age: 69
End: 2024-06-19

## 2024-06-19 DIAGNOSIS — L82.1 OTHER SEBORRHEIC KERATOSIS: ICD-10-CM

## 2024-06-19 DIAGNOSIS — Z12.83 ENCOUNTER FOR SCREENING FOR MALIGNANT NEOPLASM OF SKIN: ICD-10-CM

## 2024-06-19 DIAGNOSIS — C44.319 BASAL CELL CARCINOMA OF SKIN OF OTHER PARTS OF FACE: ICD-10-CM

## 2024-06-19 DIAGNOSIS — Z85.820 PERSONAL HISTORY OF MALIGNANT MELANOMA OF SKIN: ICD-10-CM

## 2024-06-19 DIAGNOSIS — B35.1 TINEA UNGUIUM: ICD-10-CM

## 2024-06-19 DIAGNOSIS — D22.9 MELANOCYTIC NEVI, UNSPECIFIED: ICD-10-CM

## 2024-06-19 PROCEDURE — G2211 COMPLEX E/M VISIT ADD ON: CPT

## 2024-06-19 PROCEDURE — 99214 OFFICE O/P EST MOD 30 MIN: CPT

## 2024-06-19 NOTE — HISTORY OF PRESENT ILLNESS
[FreeTextEntry1] : spots, hx of melanoma, nail fungus [de-identified] : Ms. SOHAM MCNEAL is a 68 year old female who presents for f/u as below.    #Hx of Melanoma ([id number]. 1.7 mm depth) on L mid back, September 2019, S/p WLE and negative sentinel node biopsy 10/2019. No recurrence since surgery. No fever, chills, weight loss or night sweats.  Follows regularly with gyn, ophthalmologist, and dentist. Notes using latanoprost eye drops for "pressure in eyes" (stable per patient)  #FBSE  #Toenail fungus, progressing on R foot. xyears - 5/2023: slight improvement with ciclopirox solution BID since LV Nov 2022 - 12/2023: notes she forgets to wipe off solution every week, so maybe application is not effective - 6/2024: not improving with topical  Derm hx: Mild-mod dysplastic nevus with clear margins on left areola (bx 3/2020); Tinea pedis Personal hx of skin cancer: yes - BCC on central forehead, biopsied by Dr. 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

## 2024-06-19 NOTE — ASSESSMENT
[FreeTextEntry1] : History of melanoma - No evidence of recurrence or repigmentation, no LAD - Denies fevers, chills, weight loss - Recommend to f/u with ophthalmology, dentist, and Gynec annually - Discussed mole monitoring and sun protective measures - RTC 6 months FBSE  History of BCC, central forehead - No evidence of recurrence - Sun protective measures reinforced - Recommend full body skin exam atleast annually  Multiple melanocytic nevi, benign  - Monitor moles for changes  - Recommend wearing hats and sun protective clothing or OTC sunscreen products (SPF 30+, broad band, EltaMD/La Roche Posay/Neutrogena) daily on your face and entire body (apply sunscreen atleast 30 minutes prior to going outside). Reapply sunscreen every 2 hours when outside.  Seborrheic Keratosis - These growths are benign - Related to genetics - these lesions run in families; NOT related to sun exposure - No treatment warranted unless inflamed; can use OTC Sarna lotion PRN itch  Screening exam for skin cancer - no suspicious lesions on exam today - TBSE performed today - Advised sun protection.  Recommended OTC sunscreen products (EltaMD/Neutrogena/La Roche Posay), including SPF30+ with broadband UV protection as well as proper use.  Discussed OTC sun protective clothing - Counseled patient to monitor for changes, including mole monitoring and self-skin exams  Onychomycosis, R toenails - chronic; flaring - Diagnosis, chronic nature, disease course, treatment options and goals of therapy discussed - Continue ciclopirox solution, BID, to AA. Wipe off with alcohol pad every 7 days - Pending baseline labs today (CBC, CMP), START oral terbinafine 250mg xtotal 12 week course.  Will prescribe 1 month supply after baseline labs Repeat CBC/CMP in 3 weeks, then will send 2 additional months  Medication Monitoring Encounter (terbinafine) - Discussed side effects of the terbinafine include hepatitis (inflammation of the liver) and a decrease in the white blood cells that fight infection. This is the reason that we monitor your labs. It can also cause nausea, vomiting, or diarrhea. Terbinafine has the potential to harm the liver and consequently, you will need to cut back on your drinking to no more than 1 beer per night as combining alcohol with terbinafine increases the chance of liver injury. Rare side effects include altered taste sensation. Very rarely, it can cause a serious allergic reaction in the skin. If you develop any skin pain or blistering while on this medication, please let us know.  - Please expect it will take 12-18 months for the abnormal portions of the toenail to completely grow out and look normal again. Please be aware that you may develop a fungal infection of your nails again regardless of what treatment you use for this. Toenail fungus will not alter your lifespan.

## 2024-06-19 NOTE — PHYSICAL EXAM
[Alert] : alert [Oriented x 3] : ~L oriented x 3 [Well Nourished] : well nourished [Conjunctiva Non-injected] : conjunctiva non-injected [No Visual Lymphadenopathy] : no visual  lymphadenopathy [No Clubbing] : no clubbing [No Edema] : no edema [No Bromhidrosis] : no bromhidrosis [No Chromhidrosis] : no chromhidrosis [Full Body Skin Exam Performed] : performed [FreeTextEntry3] : General: Alert and oriented, in NAD.  All of the following were examined and were within normal limits, except as noted:  Scalp:  Face:  Neck:  Chest/Back/Abdomen:   Arms/Hands:  Legs/Feet:  Buttocks, Genitalia:  Hair, Nails, Oral Mucosa, Eyes:  Lymph Nodes: - WHSS central forehead - Fairly uniform and regular brown macules and pink-brown papules on the trunk and extremities.  - Scattered, brown, stuck-on papules including on left areola  - Firm pigmented papules R calf and L posterior thigh - Well healed linear surgical scar on L mid-back w/o evidence of repigmentation or nodularity. Just adjacent to the middle of scar is a brown homogenous macule - blue ink macule at inferior edge of scar on upper back - No axillary, cervical, supraclavicular, inguinal LAD  - several toenails R foot with subungual debris and nail thickening

## 2024-06-26 ENCOUNTER — OFFICE (OUTPATIENT)
Dept: URBAN - METROPOLITAN AREA CLINIC 35 | Facility: CLINIC | Age: 69
Setting detail: OPHTHALMOLOGY
End: 2024-06-26
Payer: MEDICARE

## 2024-06-26 DIAGNOSIS — H25.13: ICD-10-CM

## 2024-06-26 DIAGNOSIS — C43.9: ICD-10-CM

## 2024-06-26 DIAGNOSIS — H02.831: ICD-10-CM

## 2024-06-26 DIAGNOSIS — H02.834: ICD-10-CM

## 2024-06-26 DIAGNOSIS — H40.053: ICD-10-CM

## 2024-06-26 DIAGNOSIS — H43.392: ICD-10-CM

## 2024-06-26 PROCEDURE — 92014 COMPRE OPH EXAM EST PT 1/>: CPT | Performed by: OPHTHALMOLOGY

## 2024-06-26 ASSESSMENT — CONFRONTATIONAL VISUAL FIELD TEST (CVF)
OD_FINDINGS: FULL
OS_FINDINGS: FULL

## 2024-06-26 ASSESSMENT — LID POSITION - DERMATOCHALASIS
OS_DERMATOCHALASIS: LUL 1+
OD_DERMATOCHALASIS: RUL 1+

## 2024-08-11 ENCOUNTER — NON-APPOINTMENT (OUTPATIENT)
Age: 69
End: 2024-08-11

## 2024-11-01 ENCOUNTER — DOCTOR'S OFFICE (OUTPATIENT)
Facility: LOCATION | Age: 69
Setting detail: OPHTHALMOLOGY
End: 2024-11-01
Payer: MEDICARE

## 2024-11-01 DIAGNOSIS — H40.053: ICD-10-CM

## 2024-11-01 DIAGNOSIS — H02.831: ICD-10-CM

## 2024-11-01 DIAGNOSIS — H02.834: ICD-10-CM

## 2024-11-01 DIAGNOSIS — H25.13: ICD-10-CM

## 2024-11-01 DIAGNOSIS — H43.392: ICD-10-CM

## 2024-11-01 DIAGNOSIS — C43.9: ICD-10-CM

## 2024-11-01 PROCEDURE — 99213 OFFICE O/P EST LOW 20 MIN: CPT | Performed by: OPHTHALMOLOGY

## 2024-11-01 ASSESSMENT — KERATOMETRY
OD_K1POWER_DIOPTERS: 44.00
OS_K1POWER_DIOPTERS: 44.25
OS_AXISANGLE_DEGREES: 106
OD_AXISANGLE_DEGREES: 049
METHOD_AUTO_MANUAL: AUTO
OS_K2POWER_DIOPTERS: 45.00
OD_K2POWER_DIOPTERS: 44.75

## 2024-11-01 ASSESSMENT — REFRACTION_CURRENTRX
OS_AXIS: 075
OD_SPHERE: -2.75
OD_VPRISM_DIRECTION: SV
OD_AXIS: 085
OD_AXIS: 000
OD_OVR_VA: 20/
OS_CYLINDER: +0.75
OS_AXIS: 160
OS_SPHERE: -2.25
OD_CYLINDER: +0.50
OS_SPHERE: -2.50
OS_OVR_VA: 20/
OS_CYLINDER: -0.50
OD_OVR_VA: 20/
OD_AXIS: 178
OD_VPRISM_DIRECTION: SV
OD_CYLINDER: -0.50
OS_SPHERE: -3.00
OS_SPHERE: -2.50
OD_AXIS: 175
OS_VPRISM_DIRECTION: SV
OD_SPHERE: -2.75
OD_SPHERE: -3.00
OS_OVR_VA: 20/
OS_OVR_VA: 20/
OS_CYLINDER: -1.00
OS_AXIS: 152
OS_AXIS: 158
OS_CYLINDER: -0.75
OD_CYLINDER: -0.50
OD_SPHERE: -3.25
OD_OVR_VA: 20/
OD_CYLINDER: -0.50
OS_VPRISM_DIRECTION: SV

## 2024-11-01 ASSESSMENT — REFRACTION_MANIFEST
OD_AXIS: 077
OS_CYLINDER: -0.75
OD_SPHERE: -3.00
OS_AXIS: 160
OD_AXIS: 138
OS_SPHERE: -2.25
OS_SPHERE: -2.50
OS_VA1: 20/20-1
OS_CYLINDER: -0.50
OS_AXIS: 165
OD_VA1: 20/25
OD_VA1: 20/20-1
OS_CYLINDER: +0.50
OD_SPHERE: -2.50
OS_SPHERE: -2.50
OD_VA1: 20/25+2
OS_SPHERE: -3.00
OD_CYLINDER: +0.50
OD_AXIS: 170
OD_CYLINDER: -0.50
OD_SPHERE: -2.75
OD_SPHERE: -3.25
OS_CYLINDER: -0.75
OS_CYLINDER: -0.50
OS_SPHERE: -2.25
OS_AXIS: 165
OD_SPHERE: -2.75
OS_VA1: 20/20-3
OD_CYLINDER: -0.50
OD_SPHERE: -3.00
OD_AXIS: 170
OS_SPHERE: -2.50
OS_AXIS: 155
OD_VA1: 20/20-1
OS_VA1: 20/20
OS_CYLINDER: -1.00
OD_AXIS: 175
OS_AXIS: 066
OS_VA1: 20/20
OD_CYLINDER: -0.50
OS_CYLINDER: -1.00
OD_AXIS: 165
OS_AXIS: 170
OD_VA1: 20/20
OD_VA1: 20/25-
OD_VA1: 20/25-1
OD_SPHERE: -2.75
OD_CYLINDER: -0.50
OD_CYLINDER: -0.25
OS_VA1: 20/20
OS_VA1: 20/20
OD_AXIS: 175
OS_AXIS: 157
OS_SPHERE: -2.50
OD_CYLINDER: -0.50

## 2024-11-01 ASSESSMENT — REFRACTION_AUTOREFRACTION
OD_SPHERE: -2.75
OS_CYLINDER: -0.50
OS_SPHERE: -2.25
OS_AXIS: 153
OD_CYLINDER: -0.25
OD_AXIS: 015

## 2024-11-01 ASSESSMENT — TONOMETRY
OS_IOP_MMHG: 21
OD_IOP_MMHG: 21

## 2024-11-01 ASSESSMENT — PACHYMETRY
OD_CT_UM: 596
OS_CT_CORRECTION: -4
OD_CT_CORRECTION: -4
OS_CT_UM: 597

## 2024-11-01 ASSESSMENT — VISUAL ACUITY
OD_BCVA: 20/20-
OS_BCVA: 20/20-2

## 2024-11-01 ASSESSMENT — CONFRONTATIONAL VISUAL FIELD TEST (CVF)
OS_FINDINGS: FULL
OD_FINDINGS: FULL

## 2024-11-01 ASSESSMENT — LID POSITION - DERMATOCHALASIS
OS_DERMATOCHALASIS: LUL 1+
OD_DERMATOCHALASIS: RUL 1+

## 2024-12-15 ENCOUNTER — NON-APPOINTMENT (OUTPATIENT)
Age: 69
End: 2024-12-15

## 2024-12-16 ENCOUNTER — APPOINTMENT (OUTPATIENT)
Dept: DERMATOLOGY | Facility: CLINIC | Age: 69
End: 2024-12-16
Payer: MEDICARE

## 2024-12-16 VITALS — WEIGHT: 185 LBS | BODY MASS INDEX: 29.03 KG/M2 | HEIGHT: 67 IN

## 2024-12-16 DIAGNOSIS — Z85.820 PERSONAL HISTORY OF MALIGNANT MELANOMA OF SKIN: ICD-10-CM

## 2024-12-16 DIAGNOSIS — B00.1 HERPESVIRAL VESICULAR DERMATITIS: ICD-10-CM

## 2024-12-16 DIAGNOSIS — Z12.83 ENCOUNTER FOR SCREENING FOR MALIGNANT NEOPLASM OF SKIN: ICD-10-CM

## 2024-12-16 DIAGNOSIS — L82.1 OTHER SEBORRHEIC KERATOSIS: ICD-10-CM

## 2024-12-16 DIAGNOSIS — D22.9 MELANOCYTIC NEVI, UNSPECIFIED: ICD-10-CM

## 2024-12-16 DIAGNOSIS — B35.1 TINEA UNGUIUM: ICD-10-CM

## 2024-12-16 PROCEDURE — 99214 OFFICE O/P EST MOD 30 MIN: CPT

## 2024-12-16 PROCEDURE — G2211 COMPLEX E/M VISIT ADD ON: CPT

## 2024-12-16 RX ORDER — VALACYCLOVIR 1 G/1
1 TABLET, FILM COATED ORAL
Qty: 4 | Refills: 0 | Status: ACTIVE | COMMUNITY
Start: 2024-12-16 | End: 1900-01-01

## 2025-01-06 ENCOUNTER — NON-APPOINTMENT (OUTPATIENT)
Age: 70
End: 2025-01-06

## 2025-03-12 ENCOUNTER — DOCTOR'S OFFICE (OUTPATIENT)
Facility: LOCATION | Age: 70
Setting detail: OPHTHALMOLOGY
End: 2025-03-12
Payer: MEDICARE

## 2025-03-12 DIAGNOSIS — H40.053: ICD-10-CM

## 2025-03-12 DIAGNOSIS — H02.831: ICD-10-CM

## 2025-03-12 DIAGNOSIS — C43.9: ICD-10-CM

## 2025-03-12 DIAGNOSIS — H43.392: ICD-10-CM

## 2025-03-12 DIAGNOSIS — H02.834: ICD-10-CM

## 2025-03-12 DIAGNOSIS — H25.13: ICD-10-CM

## 2025-03-12 PROCEDURE — 92083 EXTENDED VISUAL FIELD XM: CPT | Performed by: OPHTHALMOLOGY

## 2025-03-12 PROCEDURE — 92133 CPTRZD OPH DX IMG PST SGM ON: CPT | Performed by: OPHTHALMOLOGY

## 2025-03-12 PROCEDURE — 92012 INTRM OPH EXAM EST PATIENT: CPT | Performed by: OPHTHALMOLOGY

## 2025-03-12 ASSESSMENT — KERATOMETRY
OD_K1POWER_DIOPTERS: 44.00
OS_K1POWER_DIOPTERS: 44.25
OD_AXISANGLE_DEGREES: 062
OD_K2POWER_DIOPTERS: 44.75
OS_AXISANGLE_DEGREES: 112
OS_K2POWER_DIOPTERS: 45.00
METHOD_AUTO_MANUAL: AUTO

## 2025-03-12 ASSESSMENT — REFRACTION_CURRENTRX
OD_OVR_VA: 20/
OS_CYLINDER: +1.00
OD_SPHERE: -3.25
OD_CYLINDER: -0.50
OD_CYLINDER: +0.50
OD_AXIS: 175
OD_AXIS: 082
OD_SPHERE: +3.50
OS_AXIS: 158
OD_SPHERE: -3.00
OS_VPRISM_DIRECTION: SV
OS_CYLINDER: -1.00
OD_AXIS: 000
OS_SPHERE: -3.00
OD_OVR_VA: 20/
OS_OVR_VA: 20/
OS_OVR_VA: 20/
OS_SPHERE: -2.25
OS_CYLINDER: +0.75
OD_AXIS: 085
OD_VPRISM_DIRECTION: SV
OS_SPHERE: -3.25
OS_AXIS: 075
OS_CYLINDER: -0.75
OD_OVR_VA: 20/
OS_SPHERE: -2.50
OS_AXIS: 152
OD_CYLINDER: +0.50
OS_AXIS: 072
OS_OVR_VA: 20/
OD_VPRISM_DIRECTION: SV
OS_VPRISM_DIRECTION: SV
OD_CYLINDER: -0.50
OD_SPHERE: -2.75

## 2025-03-12 ASSESSMENT — LID POSITION - DERMATOCHALASIS
OS_DERMATOCHALASIS: LUL 1+
OD_DERMATOCHALASIS: RUL 1+

## 2025-03-12 ASSESSMENT — REFRACTION_MANIFEST
OS_AXIS: 170
OD_CYLINDER: -0.50
OD_AXIS: 077
OS_VA1: 20/20
OS_CYLINDER: -0.50
OD_VA1: 20/20-1
OS_CYLINDER: -1.00
OD_CYLINDER: -0.25
OD_VA1: 20/20
OS_SPHERE: -2.50
OD_AXIS: 175
OS_CYLINDER: -0.50
OD_SPHERE: -2.75
OS_CYLINDER: +0.50
OS_SPHERE: -2.25
OD_CYLINDER: -0.50
OD_CYLINDER: -0.50
OD_VA1: 20/20-1
OS_VA1: 20/20
OS_SPHERE: -2.50
OS_VA1: 20/20-1
OS_VA1: 20/20
OD_VA1: 20/25+2
OS_AXIS: 157
OD_CYLINDER: +0.50
OD_CYLINDER: -0.50
OS_SPHERE: -2.50
OS_AXIS: 160
OS_SPHERE: -2.25
OD_VA1: 20/25
OD_AXIS: 175
OD_SPHERE: -3.00
OD_AXIS: 170
OS_SPHERE: -3.00
OS_CYLINDER: -1.00
OD_CYLINDER: -0.50
OD_VA1: 20/25-
OD_SPHERE: -3.00
OS_AXIS: 165
OD_SPHERE: -2.50
OS_CYLINDER: -0.75
OS_AXIS: 066
OS_VA1: 20/20
OD_SPHERE: -2.75
OD_SPHERE: -3.25
OS_AXIS: 165
OS_VA1: 20/20-3
OS_SPHERE: -2.50
OS_CYLINDER: -0.75
OD_SPHERE: -2.75
OD_VA1: 20/25-1
OS_AXIS: 155
OD_AXIS: 170
OD_AXIS: 138
OD_AXIS: 165

## 2025-03-12 ASSESSMENT — REFRACTION_AUTOREFRACTION
OS_CYLINDER: -0.50
OS_AXIS: 158
OS_SPHERE: -2.25
OD_SPHERE: -3.00
OD_CYLINDER: 0.00
OD_AXIS: 000

## 2025-03-12 ASSESSMENT — PACHYMETRY
OD_CT_UM: 596
OD_CT_CORRECTION: -4
OS_CT_CORRECTION: -4
OS_CT_UM: 597

## 2025-03-12 ASSESSMENT — VISUAL ACUITY
OS_BCVA: 20/20-1
OD_BCVA: 20/20-1

## 2025-06-20 ENCOUNTER — APPOINTMENT (OUTPATIENT)
Dept: ORTHOPEDIC SURGERY | Facility: CLINIC | Age: 70
End: 2025-06-20
Payer: MEDICARE

## 2025-06-20 VITALS — WEIGHT: 185 LBS | HEIGHT: 67 IN | BODY MASS INDEX: 29.03 KG/M2

## 2025-06-20 PROCEDURE — 99203 OFFICE O/P NEW LOW 30 MIN: CPT

## 2025-06-20 PROCEDURE — 73564 X-RAY EXAM KNEE 4 OR MORE: CPT | Mod: RT

## 2025-06-20 PROCEDURE — G2211 COMPLEX E/M VISIT ADD ON: CPT

## 2025-07-15 ENCOUNTER — DOCTOR'S OFFICE (OUTPATIENT)
Facility: LOCATION | Age: 70
Setting detail: OPHTHALMOLOGY
End: 2025-07-15
Payer: MEDICARE

## 2025-07-15 DIAGNOSIS — H25.13: ICD-10-CM

## 2025-07-15 DIAGNOSIS — C43.9: ICD-10-CM

## 2025-07-15 DIAGNOSIS — H40.053: ICD-10-CM

## 2025-07-15 DIAGNOSIS — H02.831: ICD-10-CM

## 2025-07-15 DIAGNOSIS — H02.834: ICD-10-CM

## 2025-07-15 DIAGNOSIS — H43.392: ICD-10-CM

## 2025-07-15 PROCEDURE — 92250 FUNDUS PHOTOGRAPHY W/I&R: CPT | Performed by: OPHTHALMOLOGY

## 2025-07-15 PROCEDURE — 92133 CPTRZD OPH DX IMG PST SGM ON: CPT | Performed by: OPHTHALMOLOGY

## 2025-07-15 PROCEDURE — 92014 COMPRE OPH EXAM EST PT 1/>: CPT | Performed by: OPHTHALMOLOGY

## 2025-07-15 ASSESSMENT — LID POSITION - DERMATOCHALASIS
OD_DERMATOCHALASIS: RUL 1+
OS_DERMATOCHALASIS: LUL 1+

## 2025-07-15 ASSESSMENT — REFRACTION_MANIFEST
OD_AXIS: 175
OD_CYLINDER: -0.50
OS_VA1: 20/20
OD_CYLINDER: -0.50
OS_SPHERE: -2.25
OS_CYLINDER: -1.00
OS_SPHERE: -2.50
OD_SPHERE: -2.75
OD_VA1: 20/20
OD_SPHERE: -2.75
OD_CYLINDER: -0.50
OD_SPHERE: -2.75
OD_VA1: 20/20-1
OD_AXIS: 170
OD_CYLINDER: -0.50
OD_CYLINDER: -0.25
OS_SPHERE: -3.00
OS_CYLINDER: +0.50
OD_CYLINDER: -0.50
OD_VA1: 20/25+2
OD_AXIS: 165
OS_VA1: 20/20
OS_CYLINDER: -0.75
OS_CYLINDER: -0.50
OS_AXIS: 160
OS_AXIS: 155
OS_SPHERE: -2.50
OS_AXIS: 066
OD_AXIS: 170
OD_SPHERE: -3.00
OD_VA1: 20/25-1
OD_SPHERE: -3.00
OS_SPHERE: -2.50
OS_VA1: 20/20-3
OD_AXIS: 138
OS_VA1: 20/20-1
OS_AXIS: 165
OS_AXIS: 170
OS_AXIS: 165
OS_CYLINDER: -0.75
OS_SPHERE: -2.25
OD_AXIS: 077
OS_CYLINDER: -1.00
OD_VA1: 20/25
OS_CYLINDER: -0.50
OD_VA1: 20/25-
OS_SPHERE: -2.50
OD_AXIS: 175
OD_VA1: 20/20-1
OD_SPHERE: -3.25
OS_VA1: 20/20
OS_AXIS: 157
OS_VA1: 20/20
OD_CYLINDER: +0.50
OD_SPHERE: -2.50

## 2025-07-15 ASSESSMENT — REFRACTION_CURRENTRX
OS_CYLINDER: -1.00
OD_CYLINDER: -0.50
OD_SPHERE: -3.25
OD_VPRISM_DIRECTION: SV
OS_CYLINDER: -0.75
OD_CYLINDER: +0.50
OS_CYLINDER: +0.75
OS_SPHERE: -3.00
OD_AXIS: 175
OS_SPHERE: -3.25
OD_CYLINDER: +0.50
OD_CYLINDER: -0.50
OD_SPHERE: -2.75
OS_AXIS: 072
OS_VPRISM_DIRECTION: SV
OD_OVR_VA: 20/
OD_SPHERE: -3.00
OS_SPHERE: -2.50
OD_SPHERE: +3.50
OS_AXIS: 152
OS_AXIS: 075
OS_OVR_VA: 20/
OS_OVR_VA: 20/
OD_AXIS: 000
OS_SPHERE: -2.25
OS_AXIS: 158
OD_VPRISM_DIRECTION: SV
OD_OVR_VA: 20/
OD_AXIS: 082
OS_VPRISM_DIRECTION: SV
OS_OVR_VA: 20/
OD_OVR_VA: 20/
OS_CYLINDER: +1.00
OD_AXIS: 085

## 2025-07-15 ASSESSMENT — KERATOMETRY
OD_K2POWER_DIOPTERS: 44.75
OD_K1POWER_DIOPTERS: 44.00
OS_K1POWER_DIOPTERS: 44.25
OD_AXISANGLE_DEGREES: 059
OS_AXISANGLE_DEGREES: 101
OS_K2POWER_DIOPTERS: 45.00
METHOD_AUTO_MANUAL: AUTO

## 2025-07-15 ASSESSMENT — REFRACTION_AUTOREFRACTION
OS_AXIS: 152
OD_CYLINDER: SPH
OD_SPHERE: -2.75
OS_CYLINDER: -0.75
OS_SPHERE: -2.25
OD_AXIS: 000

## 2025-07-15 ASSESSMENT — VISUAL ACUITY
OS_BCVA: 20/20
OD_BCVA: 20/20-1

## 2025-07-15 ASSESSMENT — TONOMETRY
OS_IOP_MMHG: 20
OD_IOP_MMHG: 20

## 2025-07-15 ASSESSMENT — PACHYMETRY
OS_CT_UM: 597
OD_CT_CORRECTION: -4
OS_CT_CORRECTION: -4
OD_CT_UM: 596

## 2025-07-15 ASSESSMENT — CONFRONTATIONAL VISUAL FIELD TEST (CVF)
OS_FINDINGS: FULL
OD_FINDINGS: FULL